# Patient Record
Sex: FEMALE | Race: WHITE | Employment: FULL TIME | URBAN - METROPOLITAN AREA
[De-identification: names, ages, dates, MRNs, and addresses within clinical notes are randomized per-mention and may not be internally consistent; named-entity substitution may affect disease eponyms.]

---

## 2017-01-09 ENCOUNTER — OFFICE VISIT (OUTPATIENT)
Dept: ORTHOPEDIC SURGERY | Age: 42
End: 2017-01-09

## 2017-01-09 VITALS
WEIGHT: 169.97 LBS | DIASTOLIC BLOOD PRESSURE: 65 MMHG | BODY MASS INDEX: 26.68 KG/M2 | HEIGHT: 67 IN | SYSTOLIC BLOOD PRESSURE: 117 MMHG | HEART RATE: 89 BPM

## 2017-01-09 DIAGNOSIS — M75.41 SHOULDER IMPINGEMENT, RIGHT: Primary | ICD-10-CM

## 2017-01-09 PROCEDURE — 99024 POSTOP FOLLOW-UP VISIT: CPT | Performed by: ORTHOPAEDIC SURGERY

## 2017-01-12 ENCOUNTER — TELEPHONE (OUTPATIENT)
Dept: ORTHOPEDIC SURGERY | Age: 42
End: 2017-01-12

## 2017-02-13 ENCOUNTER — OFFICE VISIT (OUTPATIENT)
Dept: ORTHOPEDIC SURGERY | Age: 42
End: 2017-02-13

## 2017-02-13 VITALS
HEART RATE: 92 BPM | SYSTOLIC BLOOD PRESSURE: 108 MMHG | WEIGHT: 169.97 LBS | HEIGHT: 67 IN | BODY MASS INDEX: 26.68 KG/M2 | DIASTOLIC BLOOD PRESSURE: 64 MMHG

## 2017-02-13 DIAGNOSIS — M75.41 SHOULDER IMPINGEMENT, RIGHT: Primary | ICD-10-CM

## 2017-02-13 PROCEDURE — 99024 POSTOP FOLLOW-UP VISIT: CPT | Performed by: ORTHOPAEDIC SURGERY

## 2017-05-08 ENCOUNTER — TELEPHONE (OUTPATIENT)
Dept: ORTHOPEDIC SURGERY | Age: 42
End: 2017-05-08

## 2021-11-15 ENCOUNTER — OFFICE VISIT (OUTPATIENT)
Dept: PULMONOLOGY | Age: 46
End: 2021-11-15
Payer: COMMERCIAL

## 2021-11-15 VITALS
WEIGHT: 212.5 LBS | HEIGHT: 66 IN | SYSTOLIC BLOOD PRESSURE: 122 MMHG | HEART RATE: 71 BPM | RESPIRATION RATE: 16 BRPM | OXYGEN SATURATION: 98 % | BODY MASS INDEX: 34.15 KG/M2 | DIASTOLIC BLOOD PRESSURE: 72 MMHG

## 2021-11-15 DIAGNOSIS — G25.81 RLS (RESTLESS LEGS SYNDROME): ICD-10-CM

## 2021-11-15 DIAGNOSIS — R53.83 OTHER FATIGUE: ICD-10-CM

## 2021-11-15 DIAGNOSIS — E66.9 OBESITY (BMI 30-39.9): ICD-10-CM

## 2021-11-15 DIAGNOSIS — F51.04 PSYCHOPHYSIOLOGICAL INSOMNIA: ICD-10-CM

## 2021-11-15 DIAGNOSIS — R06.83 SNORING: Primary | ICD-10-CM

## 2021-11-15 DIAGNOSIS — G47.10 HYPERSOMNIA: ICD-10-CM

## 2021-11-15 PROCEDURE — 99204 OFFICE O/P NEW MOD 45 MIN: CPT | Performed by: NURSE PRACTITIONER

## 2021-11-15 RX ORDER — OXYBUTYNIN CHLORIDE 10 MG/1
TABLET, EXTENDED RELEASE ORAL
COMMUNITY
Start: 2021-11-11

## 2021-11-15 ASSESSMENT — SLEEP AND FATIGUE QUESTIONNAIRES
HOW LIKELY ARE YOU TO NOD OFF OR FALL ASLEEP WHILE SITTING AND READING: 2
HOW LIKELY ARE YOU TO NOD OFF OR FALL ASLEEP IN A CAR, WHILE STOPPED FOR A FEW MINUTES IN TRAFFIC: 1
NECK CIRCUMFERENCE (INCHES): 13.25
HOW LIKELY ARE YOU TO NOD OFF OR FALL ASLEEP WHILE WATCHING TV: 2
HOW LIKELY ARE YOU TO NOD OFF OR FALL ASLEEP WHILE SITTING INACTIVE IN A PUBLIC PLACE: 1
HOW LIKELY ARE YOU TO NOD OFF OR FALL ASLEEP WHILE LYING DOWN TO REST IN THE AFTERNOON WHEN CIRCUMSTANCES PERMIT: 3
ESS TOTAL SCORE: 14
HOW LIKELY ARE YOU TO NOD OFF OR FALL ASLEEP WHEN YOU ARE A PASSENGER IN A CAR FOR AN HOUR WITHOUT A BREAK: 3
HOW LIKELY ARE YOU TO NOD OFF OR FALL ASLEEP WHILE SITTING AND TALKING TO SOMEONE: 1
HOW LIKELY ARE YOU TO NOD OFF OR FALL ASLEEP WHILE SITTING QUIETLY AFTER LUNCH WITHOUT ALCOHOL: 1

## 2021-11-15 NOTE — PROGRESS NOTES
Patient ID: Kenia Espinoza is a 40 y.o. female who is being seen today for   Chief Complaint   Patient presents with   Javierbon Otero Patient     Dr. Jose Antonio Reeves, snoring      Referring: Dr. Hina Loera    HPI:   Kenia Espinoza is a 40 y.o. female in office for snoring evaluation. States she is tired all of the time. Patient reports snoring at night for the past 10+ years, worse recently. Unsure if worse in supine position. Wakes self snoring. No known witnessed apnea. No restorative sleep. +dry mouth upon awakening. Fatigue and tiredness during the day. No history of sleep apnea-states has had a sleep study previously- mild maybe? States no treatment recommended at that time. Bedtime  pm and rise time is 530-7 am. It takes usually few minutes to fall asleep. 0-1 nocturia. Wakes up 6+ times at night. It takes few- unknown minutes to fall back a sleep. Takes occasional nap during the day. No headache in am. No car wrecks or near wrecks because of the sleepiness. No nodding off while driving but can get sleepy at times. Gained 60 pounds in the past 2-3 years. +forgetfulness and decreased concentration. No nasal congestion at night. Drinks 1-2 caffinated beverages per day. Occasional alcohol. +restless feelings in legs at night. No loss of muscle strength when angry or laugh. No hallucination when dozing off or waking up from sleep. No paralysis upon awakening from sleep or going to sleep.  +teeth grinding. No nightmares. No sleep walking. No night time panic attacks. No narcotics. No drug abuse. +history of depression, +history of anxiety states feels pretty well controlled. No history of atrial fibrillation. No history of DM. No history of HTN. No history of ischemic heart disease. No history of stroke. ESS is 14. No smoking. No known FH for RLS or narcolepsy.  +FH for RY- [facility]     Sleep Medicine 11/15/2021   Sitting and reading 2   Watching TV 2   Sitting, inactive in a public place (e.g. a theatre or a meeting) 1   As a passenger in a car for an hour without a break 3   Lying down to rest in the afternoon when circumstances permit 3   Sitting and talking to someone 1   Sitting quietly after a lunch without alcohol 1   In a car, while stopped for a few minutes in traffic 1   Total score 14   Neck circumference 13.25       Past Medical History:  Past Medical History:   Diagnosis Date    Insomnia        Past Surgical History:        Procedure Laterality Date    ENDOMETRIAL BIOPSY      SHOULDER SURGERY Right 11/29/2016       Allergies:  is allergic to pcn [penicillins]. Social History:    TOBACCO:   reports that she has never smoked. She has never used smokeless tobacco.  ETOH:   reports no history of alcohol use. Family History:   History reviewed. No pertinent family history.     Current Medications:    Current Outpatient Medications:     oxybutynin (DITROPAN-XL) 10 MG extended release tablet, , Disp: , Rfl:     cetirizine (ZYRTEC) 10 MG tablet, Take 10 mg by mouth daily, Disp: , Rfl:     FLUoxetine (PROZAC) 20 MG capsule, , Disp: , Rfl: 0    promethazine (PHENERGAN) 25 MG tablet, TAKE 1 TABELT BY MOUTH EVERY 6 HOURS AS NEEDED FOR NAUSEA (Patient not taking: Reported on 11/15/2021), Disp: , Rfl: 0    azithromycin (ZITHROMAX) 250 MG tablet, , Disp: , Rfl:     Review of Systems  Constitutional: Negative for fever  HENT: Negative for sore throat  Eyes: Negative for redness   Respiratory: Negative for dyspnea, cough  Cardiovascular: Negative for chest pain  Gastrointestinal: Negative for vomiting, diarrhea   Genitourinary: Negative for hematuria   Musculoskeletal: Negative forarthralgias   Skin: Negative for rash  Neurological: Negative for syncope  Hematological: Negative for adenopathy  Psychiatric/Behavorial: Negative for anxiety      Objective:   PHYSICAL EXAM:  /72   Pulse 71   Resp 16   Ht 5' 6\" (1.676 m)   Wt 212 lb 8 oz (96.4 kg)   SpO2 98% Comment: with RA  BMI 34.30 kg/m²     Physical Exam  Gen: No acute distress. Obese. BMI of 34.30  Eyes: PERRL. No sclera icterus. No conjunctival injection. ENT: No discharge. Pharynx clear. Mallampati class IV. Neck: Trachea midline. No obvious mass. Neck circumference 13.25\"  Resp: No accessory muscle use. Nocrackles. No wheezes. No rhonchi. CV: Regular rate. Regular rhythm. No murmur or rub. Skin: Warm and dry. M/S: No cyanosis. No obvious joint deformity. Neuro: Awake. Alert. Moves all four extremities. Psych: Oriented x 3. No anxiety. DATA:       Assessment:       · Snoring  · Hypersomnia   · Sleep maintenance insomnia  · RLS  · Obesity        Plan:      · PSG (pt preference vs HST) to evaluate forsleep related breathing disorder. · Treatment options were discussed with patient if PSG reveals RY, including CPAP therapy, oral appliances and upper airway surgery. · Sleep hygiene  · D/W patient non pharmacological therapy for RLS including avoiding aggravating factors (sleep deprivation, mental alerting activities at time of rest, antihistamines), moderate regular exercise, leg message and heating pads/hot shower. · Cognitive behavioral therapy was discussed with patient including stimulus control and sleep restriction   · Avoid sedatives, alcohol and caffeinated drinks at bed time. · No driving motorized vehicles or operating heavy machinery while fatigue, drowsy or sleepy-patient verbalized understanding and agrees. · Weight loss is also recommended as a long-term intervention. · Complications of RY if not treated were discussed with patient patient to include systemic hypertension, pulmonary hypertension, cardiovascular morbidities, car accidents and all cause mortality.   Discussed pathophysiology of RY with patient today  Patient education handout provided regarding sleep tips

## 2021-11-15 NOTE — PATIENT INSTRUCTIONS
Absolutely no driving if sleepy. It is your responsibility not to drive if fatigued, tired, or sleepy   Sleep Hygiene. .. Tips for better sleep. .. Avoid naps. This will ensure you are sleepy at bedtime. If you have to take a nap, sleep less than 1 hour, before 3 pm.  Sleep only when sleepy; this reduces the time you are awake in bed. Regular exercise is recommended to help you deepen your sleep, but not within 4-6 hours of your bedtime. Timing of exercise is important, aim to exercise early in the morning or early afternoon. A light snack may help you fall asleep. Warm milk and foods high in the amino acid tryptophan, such as bananas, may help you to sleep  Be sure to avoid heavy, spicy or sugary foods 4-6 hours before bedtime and avoid at snack time. Stay away from stimulants such as caffeine and nicotine for at least 4-6 hours before bed. Stimulants can interfere with your ability to fall asleep. Caffeine is found in tea, cola, coffee, cocoa and chocolate and is best avoided at bedtime. Nicotine is found in tobacco products. Avoid alcohol 4-6 hours before bedtime. Alcohol has an immediate sleep-inducing effect, after a few hours when alcohol levels fall there is a stimulant or wake-up effect and will cause fragmented sleep. Sleep rituals are important. Give your body clues it is time to slow down and sleep. Examples include; yoga, deep breathing, listen to relaxing music, a hot bath or a few minutes of reading. Have a fixed bedtime and awakening time, Even on weekends! You will feel better keeping a regular sleep cycle, even if you are retired or not working. Get into your favorite sleep position. If not asleep in 30 minutes, get up and do something boring until you feel sleepy. Remember not to expose yourself to bright lights such as TV, phone or tablet screens. Only use your bed for sleeping. Do not use your bed as an office, workroom or recreation room. Use comfortable bedding.  Uncomfortable bedding can prevent good sleep. Ensure your bedroom is quiet and comfortable. A cooler room along with enough blankets to stay warm is recommended. If your room is too noisy, try a white noise machine. If too bright, try black out shades or an eye mask. Dont take worries to bed. Leave worries about work, school etc. behind you when you go to bed. Some people find it helpful to assign a worry period in the evening or late afternoon to write down your worries and get them out of your system. [facility] at [address] [place] [address], [address] [place] [address]                      Phone: [phone] Fax: [phone]      Please arrive at the [facility] at the time indicated. On Saturday and Sunday night a sleep tech will come down to let you in the building and escort you upstairs to the sleep center; please call from the parking lot if no one is at the door when you arrive. PLEASE DO NOT ARRIVE TO THE SLEEP CENTER ANY EARLIER THAN 8:30PM AS THERE IS NO STAFF ON DUTY AND THE DOORS WILL BE LOCKED    IMPORTANT: We ask that you please phone the [address] [place] [address] Patient Pre-Services ([phone]) at least 3-5 days prior to your sleep study to pre-register. Failing to pre-register may ultimately cause your insurance to not pay for this procedure. Please be aware that [address] [place] [address] is a non-smoking facility. There is no smoking allowed anywhere on [facility] at any time. Each patient room is a private room with cable television, WiFi and a private bathroom with shower facilities. The test itself consists of electrodes and sensors attached to specific areas of your scalp, face, chest and legs. We will also monitor respiratory effort, nasal and oral airflow and oxygen levels. The test will not hurt; it is completely painless and not invasive in any way. Please bring with you:  ? Appropriate nightclothes (pajamas, sweats, etc.), slippers and robe  ?  All

## 2021-12-06 ENCOUNTER — HOSPITAL ENCOUNTER (OUTPATIENT)
Dept: SLEEP CENTER | Age: 46
Discharge: HOME | End: 2021-12-08
Payer: COMMERCIAL

## 2021-12-06 DIAGNOSIS — R53.83 OTHER FATIGUE: ICD-10-CM

## 2021-12-06 DIAGNOSIS — G25.81 RLS (RESTLESS LEGS SYNDROME): ICD-10-CM

## 2021-12-06 DIAGNOSIS — E66.9 OBESITY (BMI 30-39.9): ICD-10-CM

## 2021-12-06 DIAGNOSIS — F51.04 PSYCHOPHYSIOLOGICAL INSOMNIA: ICD-10-CM

## 2021-12-06 DIAGNOSIS — R06.83 SNORING: ICD-10-CM

## 2021-12-06 DIAGNOSIS — G47.10 HYPERSOMNIA: ICD-10-CM

## 2021-12-06 PROCEDURE — 95810 POLYSOM 6/> YRS 4/> PARAM: CPT

## 2021-12-07 PROCEDURE — 95810 POLYSOM 6/> YRS 4/> PARAM: CPT | Performed by: INTERNAL MEDICINE

## 2021-12-10 ENCOUNTER — TELEPHONE (OUTPATIENT)
Dept: PULMONOLOGY | Age: 46
End: 2021-12-10

## 2021-12-10 DIAGNOSIS — G47.33 OSA (OBSTRUCTIVE SLEEP APNEA): Primary | ICD-10-CM

## 2021-12-27 ENCOUNTER — HOSPITAL ENCOUNTER (OUTPATIENT)
Dept: SLEEP CENTER | Age: 46
Discharge: HOME | End: 2021-12-29
Payer: COMMERCIAL

## 2021-12-27 DIAGNOSIS — G47.33 OSA (OBSTRUCTIVE SLEEP APNEA): ICD-10-CM

## 2021-12-27 PROCEDURE — 95811 POLYSOM 6/>YRS CPAP 4/> PARM: CPT

## 2022-01-04 ENCOUNTER — TELEPHONE (OUTPATIENT)
Dept: PULMONOLOGY | Age: 47
End: 2022-01-04

## 2022-01-04 DIAGNOSIS — G47.33 OSA (OBSTRUCTIVE SLEEP APNEA): Primary | ICD-10-CM

## 2022-03-14 ENCOUNTER — OFFICE VISIT (OUTPATIENT)
Dept: PULMONOLOGY | Age: 47
End: 2022-03-14
Payer: COMMERCIAL

## 2022-03-14 VITALS
HEIGHT: 67 IN | BODY MASS INDEX: 29.26 KG/M2 | HEART RATE: 86 BPM | SYSTOLIC BLOOD PRESSURE: 118 MMHG | TEMPERATURE: 97.1 F | OXYGEN SATURATION: 96 % | WEIGHT: 186.4 LBS | DIASTOLIC BLOOD PRESSURE: 70 MMHG | RESPIRATION RATE: 20 BRPM

## 2022-03-14 DIAGNOSIS — G25.81 RLS (RESTLESS LEGS SYNDROME): ICD-10-CM

## 2022-03-14 DIAGNOSIS — G47.10 HYPERSOMNIA: ICD-10-CM

## 2022-03-14 DIAGNOSIS — G47.33 MILD OBSTRUCTIVE SLEEP APNEA: Primary | ICD-10-CM

## 2022-03-14 DIAGNOSIS — Z71.89 CPAP USE COUNSELING: ICD-10-CM

## 2022-03-14 DIAGNOSIS — R53.83 OTHER FATIGUE: ICD-10-CM

## 2022-03-14 PROCEDURE — 99213 OFFICE O/P EST LOW 20 MIN: CPT | Performed by: NURSE PRACTITIONER

## 2022-03-14 ASSESSMENT — SLEEP AND FATIGUE QUESTIONNAIRES
HOW LIKELY ARE YOU TO NOD OFF OR FALL ASLEEP IN A CAR, WHILE STOPPED FOR A FEW MINUTES IN TRAFFIC: 2
HOW LIKELY ARE YOU TO NOD OFF OR FALL ASLEEP WHILE SITTING QUIETLY AFTER LUNCH WITHOUT ALCOHOL: 1
HOW LIKELY ARE YOU TO NOD OFF OR FALL ASLEEP WHILE WATCHING TV: 1
HOW LIKELY ARE YOU TO NOD OFF OR FALL ASLEEP WHILE SITTING INACTIVE IN A PUBLIC PLACE: 0
HOW LIKELY ARE YOU TO NOD OFF OR FALL ASLEEP WHILE SITTING AND READING: 1
NECK CIRCUMFERENCE (INCHES): 13
HOW LIKELY ARE YOU TO NOD OFF OR FALL ASLEEP WHILE LYING DOWN TO REST IN THE AFTERNOON WHEN CIRCUMSTANCES PERMIT: 3
HOW LIKELY ARE YOU TO NOD OFF OR FALL ASLEEP WHILE SITTING AND TALKING TO SOMEONE: 1
ESS TOTAL SCORE: 12
HOW LIKELY ARE YOU TO NOD OFF OR FALL ASLEEP WHEN YOU ARE A PASSENGER IN A CAR FOR AN HOUR WITHOUT A BREAK: 3

## 2022-03-14 NOTE — PROGRESS NOTES
Patient ID: Eva Milner is a 40 y.o. female who is being seen today for   Chief Complaint   Patient presents with    Follow-up     31-90     Referring: Dr. Kenyatta Kaiser    HPI:     Eva Milner is a 40 y.o. female in office for RY follow up. PSG and CPAP titration were reviewed by me and noted below. Results were dicussed with patient and multiple good questions were answered. PSG showed mild RY and patient started CPAP after testing. States she is doing okay with CPAP. States does feel a little better when waking, but is still fatigued. Patient is using CPAP  8-9 hrs/night. Using humidifier. No snoring on CPAP. The pressure is well tolerated. The mask is comfortable- nasal mask. No mask leak. +daytime sleepiness. Denies nodding off when driving. No dry nose or throat. +fatigue. Bedtime is  pm and rise time is 530-6 am. Sleep onset is few minutes. Wakes up 2-3 times at night total. No nocturia. It takes usually few minutes to fall back a sleep. Rare naps during the day. No headache in am. No weight gain. 1-2 caffienated beverages during the day. Occasional alcohol. ESS is 12      No hallucinations when falling or waking from sleep  No cataplexy  No sleep paralysis  Some RLS symptoms but better since starting CPAP. Initial HPI 11/15/21  Eva Milner is a 40 y.o. female in office for snoring evaluation. States she is tired all of the time. Patient reports snoring at night for the past 10+ years, worse recently. Unsure if worse in supine position. Wakes self snoring. No known witnessed apnea. No restorative sleep. +dry mouth upon awakening. Fatigue and tiredness during the day. No history of sleep apnea-states has had a sleep study previously- mild maybe? States no treatment recommended at that time. Bedtime  pm and rise time is 530-7 am. It takes usually few minutes to fall asleep. 0-1 nocturia. Wakes up 6+ times at night.  It takes few- unknown minutes to fall back a sleep. Takes occasional nap during the day. No headache in am. No car wrecks or near wrecks because of the sleepiness. No nodding off while driving but can get sleepy at times. Gained 60 pounds in the past 2-3 years. +forgetfulness and decreased concentration. No nasal congestion at night. Drinks 1-2 caffinated beverages per day. Occasional alcohol. +restless feelings in legs at night. No loss of muscle strength when angry or laugh. No hallucination when dozing off or waking up from sleep. No paralysis upon awakening from sleep or going to sleep.  +teeth grinding. No nightmares. No sleep walking. No night time panic attacks. No narcotics. No drug abuse. +history of depression, +history of anxiety states feels pretty well controlled. No history of atrial fibrillation. No history of DM. No history of HTN. No history of ischemic heart disease. No history of stroke. ESS is 14. No smoking. No known FH for RLS or narcolepsy. +FH for RY- [facility]     Sleep Medicine 3/14/2022 11/15/2021   Sitting and reading 1 2   Watching TV 1 2   Sitting, inactive in a public place (e.g. a theatre or a meeting) 0 1   As a passenger in a car for an hour without a break 3 3   Lying down to rest in the afternoon when circumstances permit 3 3   Sitting and talking to someone 1 1   Sitting quietly after a lunch without alcohol 1 1   In a car, while stopped for a few minutes in traffic 2 1   Total score 12 14   Neck circumference (Inches) 13 13.25       Past Medical History:  Past Medical History:   Diagnosis Date    Insomnia        Past Surgical History:        Procedure Laterality Date    ENDOMETRIAL BIOPSY      SHOULDER SURGERY Right 11/29/2016       Allergies:  is allergic to pcn [penicillins]. Social History:    TOBACCO:   reports that she has never smoked. She has never used smokeless tobacco.  ETOH:   reports no history of alcohol use. Family History:   History reviewed. No pertinent family history.     Current Medications:    Current Outpatient Medications:     levonorgestrel (MIRENA, 52 MG,) IUD 52 mg, 1 Units by IntraUTERine route, Disp: , Rfl:     oxybutynin (DITROPAN-XL) 10 MG extended release tablet, , Disp: , Rfl:     cetirizine (ZYRTEC) 10 MG tablet, Take 10 mg by mouth daily, Disp: , Rfl:     FLUoxetine (PROZAC) 20 MG capsule, , Disp: , Rfl: 0    promethazine (PHENERGAN) 25 MG tablet, TAKE 1 TABELT BY MOUTH EVERY 6 HOURS AS NEEDED FOR NAUSEA (Patient not taking: Reported on 11/15/2021), Disp: , Rfl: 0    azithromycin (ZITHROMAX) 250 MG tablet, , Disp: , Rfl:     Review of Systems        Objective:   PHYSICAL EXAM:  /70   Pulse 86   Temp 97.1 °F (36.2 °C)   Resp 20   Ht 5' 7\" (1.702 m)   Wt 186 lb 6.4 oz (84.6 kg)   SpO2 96% Comment: RA  BMI 29.19 kg/m²     Physical Exam  Gen: No acute distress. Obese. BMI of 29.19  Eyes: PERRL. No sclera icterus. No conjunctival injection. ENT: No discharge. Pharynx clear. Mallampati class IV. Neck: Trachea midline. No obvious mass. Neck circumference 13\"  Resp: No accessory muscle use. Nocrackles. No wheezes. No rhonchi. CV: Regular rate. Regular rhythm. No murmur or rub. Skin: Warm and dry. M/S: No cyanosis. No obvious joint deformity. Neuro: Awake. Alert. Moves all four extremities. Psych: Oriented x 3. No anxiety. DATA:   12/6/2021 PSG AHI 9/REM AHI 38.3, low SPO2 88%  12/27/2021 titration improved RY with CPAP, treatment emergent CSA. Recommendations trial of auto CPAP 6-12 cm H2O    CPAP download data:  Compliance download report from 2/9/22 to 3/10/22 reviewed today by me and showed patient is using machine 8:37 hrs/night with 97% compliance and AHI 1.6 within this time frame. 29/30days with greater than 4 hours of machine use. 90% pressure 9.2 cm H20 on auto CPAP 6-12 cm H2O       Assessment:       · Mild RY.   Optimal compliance on review today  · Snoring-resolved on CPAP  · Hypersomnia   · Sleep maintenance insomnia-improved with CPAP use  · RLS-improving  · Obesity        Plan:      · Changed in office to auto CPAP 9-13 cm H2O   · Discussed multiple causes of fatigue and tiredness. Patient will follow up with PCP for other causes and will follow up here in 6 weeks after pressure change. Could consider MSLT if no improvement  Advised to use CPAP 6-8 hrs at night and during naps. Replacement of mask, tubing, head straps every 3-6 months or sooner if damaged. Patient instructed to contact [employer] company for any mask, tubing or machine trouble shooting if problems arise. · Sleep hygiene  · D/W patient non pharmacological therapy for RLS including avoiding aggravating factors (sleep deprivation, mental alerting activities at time of rest, antihistamines), moderate regular exercise, leg message and heating pads/hot shower. · Cognitive behavioral therapy was discussed with patient including stimulus control and sleep restriction   · Avoid sedatives, alcohol and caffeinated drinks at bed time. · No driving motorized vehicles or operating heavy machinery while fatigue, drowsy or sleepy-patient verbalized understanding and agrees. · Weight loss is also recommended as a long-term intervention. · Complications of RY if not treated were discussed with patient patient to include systemic hypertension, pulmonary hypertension, cardiovascular morbidities, car accidents and all cause mortality.   Discussed pathophysiology of RY with patient today  Patient education handout provided regarding sleep tips     Follow-up: 6 weeks, sooner if needed

## 2022-03-14 NOTE — PATIENT INSTRUCTIONS
Absolutely no driving if sleepy. It is your responsibility not to drive if fatigued, tired, or sleepy     Sleep Hygiene. .. Tips for better sleep. .. Avoid naps. This will ensure you are sleepy at bedtime. If you have to take a nap, sleep less than 1 hour, before 3 pm.  Sleep only when sleepy; this reduces the time you are awake in bed. Regular exercise is recommended to help you deepen your sleep, but not within 4-6 hours of your bedtime. Timing of exercise is important, aim to exercise early in the morning or early afternoon. A light snack may help you fall asleep. Warm milk and foods high in the amino acid tryptophan, such as bananas, may help you to sleep  Be sure to avoid heavy, spicy or sugary foods 4-6 hours before bedtime and avoid at snack time. Stay away from stimulants such as caffeine and nicotine for at least 4-6 hours before bed. Stimulants can interfere with your ability to fall asleep. Caffeine is found in tea, cola, coffee, cocoa and chocolate and is best avoided at bedtime. Nicotine is found in tobacco products. Avoid alcohol 4-6 hours before bedtime. Alcohol has an immediate sleep-inducing effect, after a few hours when alcohol levels fall there is a stimulant or wake-up effect and will cause fragmented sleep. Sleep rituals are important. Give your body clues it is time to slow down and sleep. Examples include; yoga, deep breathing, listen to relaxing music, a hot bath or a few minutes of reading. Have a fixed bedtime and awakening time, Even on weekends! You will feel better keeping a regular sleep cycle, even if you are retired or not working. Get into your favorite sleep position. If not asleep in 30 minutes, get up and do something boring until you feel sleepy. Remember not to expose yourself to bright lights such as TV, phone or tablet screens. Only use your bed for sleeping. Do not use your bed as an office, workroom or recreation room. Use comfortable bedding.  Uncomfortable bedding can prevent good sleep. Ensure your bedroom is quiet and comfortable. A cooler room along with enough blankets to stay warm is recommended. If your room is too noisy, try a white noise machine. If too bright, try black out shades or an eye mask. Dont take worries to bed. Leave worries about work, school etc. behind you when you go to bed. Some people find it helpful to assign a worry period in the evening or late afternoon to write down your worries and get them out of your system. CPAP Equipment Cleaning and Disinfecting Schedule  Equipment Cleaning Frequency Instructions  Disinfecting Frequency   Non-Disposable Filters  Weekly Mild soapy water, Rinse, Air Dry Not Required   Disposable Filters Change as needed  2-4 weeks Do Not Wash Not Required   Hose/tubing Daily Mild soapy water, Rinse, Air Dry Once a week   Mask / Nasal Pillows Daily Mild soapy water, Rinse, Air Dry Once a week   Headgear Weekly Hand wash, Mild soapy water, Rinse, Dry  Not Required   Humidifier Daily Empty water daily  Mild soapy water, Rinse well, Air Dry  Once a week   CPAP Unit As Needed Dust with damp cloth,  No detergents or sprays Not Required         Disinfect (per schedule) with 1 part white vinegar and 3 parts water- soak mask and water chamber for 30 minutes every 1-2 weeks, more often if sick. Allow water/vinegar mixture to run through tubing. Allow all equipment to air dry. Drying Hints:   Always hang tubing away from direct sunlight, as this will cause the tubing to become yellow, brittle and crack over a period of time. DO NOT attach the wet tubing to your CPAP unit to blow-dry it. The moisture from the tubing can drain back into your machine. Moisture in your unit can cause sudden pressure increases or short circuits  DO's and DON'Ts:  - Don't use alcohol-based products to clean your mask, because it can cause the materials to become hard and brittle.    - Don't put headgear in the washer or dryer  - Don't use any caustic or household cleaning solutions such as bleach on your CPAP   equipment.  - Do follow the recommended cleaning schedule. - Do change your disposable filter frequently. Adapted From: [email or web address]/cleaning. shtm.   These are general suggestions for all models please follow specific s recommendations and specific instructions

## 2022-04-25 ENCOUNTER — HOSPITAL ENCOUNTER (OUTPATIENT)
Dept: MAMMOGRAPHY | Age: 47
Discharge: HOME | End: 2022-04-25
Payer: COMMERCIAL

## 2022-04-25 DIAGNOSIS — Z12.39 SCREENING BREAST EXAMINATION: ICD-10-CM

## 2022-04-25 PROCEDURE — 77063 BREAST TOMOSYNTHESIS BI: CPT

## 2022-04-26 ENCOUNTER — TELEPHONE (OUTPATIENT)
Dept: PULMONOLOGY | Age: 47
End: 2022-04-26

## 2022-04-26 ENCOUNTER — TELEMEDICINE (OUTPATIENT)
Dept: SLEEP MEDICINE | Age: 47
End: 2022-04-26
Payer: COMMERCIAL

## 2022-04-26 DIAGNOSIS — R53.83 OTHER FATIGUE: ICD-10-CM

## 2022-04-26 DIAGNOSIS — G47.33 MILD OBSTRUCTIVE SLEEP APNEA: Primary | ICD-10-CM

## 2022-04-26 DIAGNOSIS — G47.10 HYPERSOMNIA: ICD-10-CM

## 2022-04-26 DIAGNOSIS — G25.81 RLS (RESTLESS LEGS SYNDROME): ICD-10-CM

## 2022-04-26 PROCEDURE — 99214 OFFICE O/P EST MOD 30 MIN: CPT | Performed by: NURSE PRACTITIONER

## 2022-04-26 RX ORDER — ROPINIROLE 0.5 MG/1
0.5 TABLET, FILM COATED ORAL NIGHTLY
Qty: 30 TABLET | Refills: 1 | Status: SHIPPED | OUTPATIENT
Start: 2022-04-26 | End: 2022-05-26 | Stop reason: SDUPTHER

## 2022-04-26 ASSESSMENT — SLEEP AND FATIGUE QUESTIONNAIRES
ESS TOTAL SCORE: 10
HOW LIKELY ARE YOU TO NOD OFF OR FALL ASLEEP WHEN YOU ARE A PASSENGER IN A CAR FOR AN HOUR WITHOUT A BREAK: 3
HOW LIKELY ARE YOU TO NOD OFF OR FALL ASLEEP WHILE SITTING QUIETLY AFTER LUNCH WITHOUT ALCOHOL: 1
HOW LIKELY ARE YOU TO NOD OFF OR FALL ASLEEP WHILE SITTING AND TALKING TO SOMEONE: 0
HOW LIKELY ARE YOU TO NOD OFF OR FALL ASLEEP WHILE WATCHING TV: 1
HOW LIKELY ARE YOU TO NOD OFF OR FALL ASLEEP IN A CAR, WHILE STOPPED FOR A FEW MINUTES IN TRAFFIC: 1
HOW LIKELY ARE YOU TO NOD OFF OR FALL ASLEEP WHILE LYING DOWN TO REST IN THE AFTERNOON WHEN CIRCUMSTANCES PERMIT: 3
HOW LIKELY ARE YOU TO NOD OFF OR FALL ASLEEP WHILE SITTING INACTIVE IN A PUBLIC PLACE: 0
HOW LIKELY ARE YOU TO NOD OFF OR FALL ASLEEP WHILE SITTING AND READING: 1

## 2022-04-26 NOTE — TELEPHONE ENCOUNTER
Patient needs a 4 week follow up - needs to be in office due to medication being prescribed. I scheduled patient for 5/18/22 in office at [facility] [place] at 10:40am.    Patient called with message left for patient to call back to office.

## 2022-04-26 NOTE — PATIENT INSTRUCTIONS
awakening time, Even on weekends! You will feel better keeping a regular sleep cycle, even if you are retired or not working. Get into your favorite sleep position. If not asleep in 30 minutes, get up and do something boring until you feel sleepy. Remember not to expose yourself to bright lights such as TV, phone or tablet screens. Only use your bed for sleeping. Do not use your bed as an office, workroom or recreation room. Use comfortable bedding. Uncomfortable bedding can prevent good sleep. Ensure your bedroom is quiet and comfortable. A cooler room along with enough blankets to stay warm is recommended. If your room is too noisy, try a white noise machine. If too bright, try black out shades or an eye mask. Dont take worries to bed. Leave worries about work, school etc. behind you when you go to bed. Some people find it helpful to assign a worry period in the evening or late afternoon to write down your worries and get them out of your system. CPAP Equipment Cleaning and Disinfecting Schedule  Equipment Cleaning Frequency Instructions  Disinfecting Frequency   Non-Disposable Filters  Weekly Mild soapy water, Rinse, Air Dry Not Required   Disposable Filters Change as needed  2-4 weeks Do Not Wash Not Required   Hose/tubing Daily Mild soapy water, Rinse, Air Dry Once a week   Mask / Nasal Pillows Daily Mild soapy water, Rinse, Air Dry Once a week   Headgear Weekly Hand wash, Mild soapy water, Rinse, Dry  Not Required   Humidifier Daily Empty water daily  Mild soapy water, Rinse well, Air Dry  Once a week   CPAP Unit As Needed Dust with damp cloth,  No detergents or sprays Not Required         Disinfect (per schedule) with 1 part white vinegar and 3 parts water- soak mask and water chamber for 30 minutes every 1-2 weeks, more often if sick. Allow water/vinegar mixture to run through tubing. Allow all equipment to air dry.    Drying Hints:   Always hang tubing away from direct sunlight, as this will cause the tubing to become yellow, brittle and crack over a period of time. DO NOT attach the wet tubing to your CPAP unit to blow-dry it. The moisture from the tubing can drain back into your machine. Moisture in your unit can cause sudden pressure increases or short circuits  DO's and DON'Ts:  - Don't use alcohol-based products to clean your mask, because it can cause the materials to become hard and brittle. - Don't put headgear in the washer or dryer  - Don't use any caustic or household cleaning solutions such as bleach on your CPAP   equipment.  - Do follow the recommended cleaning schedule. - Do change your disposable filter frequently. Adapted From: [email or web address]/cleaning. shtm.   These are general suggestions for all models please follow specific s recommendations and specific instructions

## 2022-04-26 NOTE — PROGRESS NOTES
Patient ID: Aung So is a 40 y.o. female who is being seen today for   Chief Complaint   Patient presents with    Follow-up     6 week      Referring: Dr. Zulma Oakes    HPI:     Aung So is a 40 y.o. female for televideo appointment via video and audio doxy. me virtual visit for RY follow up. States pressure change  Patient is using CPAP 8-9 hrs/night. Using humidifier. No snoring on CPAP. The pressure is well tolerated. The mask is comfortable-nasal. No mask leak. +daytime sleepiness. States went to PCP and states did not really find other causes of fatigue. Denies nodding off when driving. No dry nose or throat. No fatigue. Bedtime is  pm and rise time is 530-6 am. Sleep onset is usually few minutes. Wakes up 4-5 times at night total, feels restless. 0-1 nocturia. It takes usually few minutes to fall back a sleep. No naps during the day. No headache in am. No weight gain. 2 caffienated beverages during the day. Occassioanl alcohol. ESS is 10. Patient still having daytime sleepiness despite adequate treatment of sleep apnea. She does state has some restlessness at night and aching in legs. She denies cataplexy. She denies sleep paralysis. She denies hallucinations when falling or waking from sleep. States she has also discussed other causes of fatigue with PCP. Previous HPI 3/14/22  Aung So is a 40 y.o. female in office for RY follow up. PSG and CPAP titration were reviewed by me and noted below. Results were dicussed with patient and multiple good questions were answered. PSG showed mild RY and patient started CPAP after testing. States she is doing okay with CPAP. States does feel a little better when waking, but is still fatigued. Patient is using CPAP  8-9 hrs/night. Using humidifier. No snoring on CPAP. The pressure is well tolerated. The mask is comfortable- nasal mask. No mask leak. +daytime sleepiness. Denies nodding off when driving.  No dry nose or throat. +fatigue. Bedtime is  pm and rise time is 530-6 am. Sleep onset is few minutes. Wakes up 2-3 times at night total. No nocturia. It takes usually few minutes to fall back a sleep. Rare naps during the day. No headache in am. No weight gain. 1-2 caffienated beverages during the day. Occasional alcohol. ESS is 12      No hallucinations when falling or waking from sleep  No cataplexy  No sleep paralysis  Some RLS symptoms but better since starting CPAP. Initial HPI 11/15/21  Vj Robert is a 40 y.o. female in office for snoring evaluation. States she is tired all of the time. Patient reports snoring at night for the past 10+ years, worse recently. Unsure if worse in supine position. Wakes self snoring. No known witnessed apnea. No restorative sleep. +dry mouth upon awakening. Fatigue and tiredness during the day. No history of sleep apnea-states has had a sleep study previously- mild maybe? States no treatment recommended at that time. Bedtime  pm and rise time is 530-7 am. It takes usually few minutes to fall asleep. 0-1 nocturia. Wakes up 6+ times at night. It takes few- unknown minutes to fall back a sleep. Takes occasional nap during the day. No headache in am. No car wrecks or near wrecks because of the sleepiness. No nodding off while driving but can get sleepy at times. Gained 60 pounds in the past 2-3 years. +forgetfulness and decreased concentration. No nasal congestion at night. Drinks 1-2 caffinated beverages per day. Occasional alcohol. +restless feelings in legs at night. No loss of muscle strength when angry or laugh. No hallucination when dozing off or waking up from sleep. No paralysis upon awakening from sleep or going to sleep.  +teeth grinding. No nightmares. No sleep walking. No night time panic attacks. No narcotics. No drug abuse. +history of depression, +history of anxiety states feels pretty well controlled. No history of atrial fibrillation.  No history of DM. No history of HTN. No history of ischemic heart disease. No history of stroke. ESS is 14. No smoking. No known FH for RLS or narcolepsy. +FH for RY- mom     Sleep Medicine 4/26/2022 3/14/2022 11/15/2021   Sitting and reading 1 1 2   Watching TV 1 1 2   Sitting, inactive in a public place (e.g. a theatre or a meeting) 0 0 1   As a passenger in a car for an hour without a break 3 3 3   Lying down to rest in the afternoon when circumstances permit 3 3 3   Sitting and talking to someone 0 1 1   Sitting quietly after a lunch without alcohol 1 1 1   In a car, while stopped for a few minutes in traffic 1 2 1   Total score 10 12 14   Neck circumference (Inches) - 13 13.25       Past Medical History:  Past Medical History:   Diagnosis Date    Insomnia        Past Surgical History:        Procedure Laterality Date    ENDOMETRIAL BIOPSY      SHOULDER SURGERY Right 11/29/2016       Allergies:  is allergic to pcn [penicillins]. Social History:    TOBACCO:   reports that she has never smoked. She has never used smokeless tobacco.  ETOH:   reports no history of alcohol use. Family History:   History reviewed. No pertinent family history. Current Medications:    Current Outpatient Medications:     levonorgestrel (MIRENA, 52 MG,) IUD 52 mg, 1 Units by IntraUTERine route, Disp: , Rfl:     oxybutynin (DITROPAN-XL) 10 MG extended release tablet, , Disp: , Rfl:     cetirizine (ZYRTEC) 10 MG tablet, Take 10 mg by mouth daily, Disp: , Rfl:     FLUoxetine (PROZAC) 20 MG capsule, , Disp: , Rfl: 0    Review of Systems        Objective:   PHYSICAL EXAM:  There were no vitals taken for this visit. Physical Exam  Exam:  Gen: No acute distress, does not appear to be in pain. Appears well developed and nourished. HENT: Head is normocephalic and atraumatic. Normal appearing nose. External Ears normal.   Neck: No visualized mass. Trachea is midline   Eyes: EOM intact. No visible discharge.    Resp:No visualized signs of difficulty breathing or respiratory distress, speaking in full sentences. Respiratory effort normal.  Neuro: Awake. Alert. Able to follow commands. No facial asymmetry. Skin: No significant lesions or discoloration noted on facial skin    Musculoskeletal: Normal range of motion of the neck. Psych: Oriented x 3. No anxiety. Normal affect. DATA:   12/6/2021 PSG AHI 9/REM AHI 38.3, low SPO2 88%  12/27/2021 titration improved RY with CPAP, treatment emergent CSA. Recommendations trial of auto CPAP 6-12 cm H2O    CPAP download data:  Compliance download report from 2/9/22 to 3/10/22 reviewed today by me and showed patient is using machine 8:37 hrs/night with 97% compliance and AHI 1.6 within this time frame. 29/30days with greater than 4 hours of machine use. 90% pressure 9.2 cm H20 on auto CPAP 6-12 cm H2O     Compliance download report from 3/23/22 to 4/21/22 reviewed today by me and showed patient is using machine 8:27 hrs/night with 100% compliance and AHI 1.4 within this time frame. 30/30days with greater than 4 hours of machine use. 90% pressure 10.6 cm H20 on auto CPAP 6-12 cm H2O (report showing 6-12 cm H2O as current pressure however in [employer] [facility] pressure is showing as auto CPAP 9-13 cm H2O)    Assessment:       · Mild RY. Auto CPAP 9-13 cm H2O? Optimal compliance on review today  · Snoring-resolved on CPAP  · Hypersomnia   · Sleep maintenance insomnia  · RLS  · Obesity        Plan:      · Auto CPAP 9-13 cm H2O -message left with [employer] rep to verify correct pressure  · Trial Requip 0.5 mg. The side effects of ropinirole have been discussed with the patient and or family, including but not limited to compulsive behaviors, mental status changes, sleep attacks and cardiovascular side effects. As with all medications, patient was instructed to read package insert & to call with any concerning side effects.   · Could consider MSLT if no improvement in sleepiness  Advised to use CPAP 6-8 hrs at night and during naps. Replacement of mask, tubing, head straps every 3-6 months or sooner if damaged. Patient instructed to contact DME company for any mask, tubing or machine trouble shooting if problems arise. · Sleep hygiene  · D/W patient non pharmacological therapy for RLS including avoiding aggravating factors (sleep deprivation, mental alerting activities at time of rest, antihistamines), moderate regular exercise, leg message and heating pads/hot shower. · Cognitive behavioral therapy was discussed with patient including stimulus control and sleep restriction   · Avoid sedatives, alcohol and caffeinated drinks at bed time. · No driving motorized vehicles or operating heavy machinery while fatigue, drowsy or sleepy-patient verbalized understanding and agrees. · Weight loss is also recommended as a long-term intervention. · Complications of RY if not treated were discussed with patient patient to include systemic hypertension, pulmonary hypertension, cardiovascular morbidities, car accidents and all cause mortality. Discussed pathophysiology of RY with patient today  Patient education handout provided regarding sleep tips     Follow-up: 4-6 weeks, sooner if needed    Nicole Marcial, was evaluated through a synchronous (real-time) audio-video encounter. The patient (or guardian if applicable) is aware that this is a billable service, which includes applicable co-pays. This Virtual Visit was conducted with patient's (and/or legal guardian's) consent. The visit was conducted pursuant to the emergency declaration under the [facility], [address] [place] [address] waiver authority and the [employer] Act. Patient identification was verified, and a caregiver was present when appropriate. The patient was located at home in a state where the provider was licensed to provide care.        Total time spent for this encounter: Not billed by time    --VICTORINO Antony - CNP on 4/26/2022 at 9:47 AM    An electronic signature was used to authenticate this note.

## 2022-04-26 NOTE — TELEPHONE ENCOUNTER
Within this Telehealth Consent, the terms you and yours refer to the person using the Telehealth Service (Service), or in the case of a use of the Service by or on behalf of a minor, you and yours refer to and include (i) the parent or legal guardian who provides consent to the use of the Service by such minor or uses the Service on behalf of such minor, and (ii) the minor for whom consent is being provided or on whose behalf the Service is being utilized. When using Service, you will be consulting with your health care providers via the use of Telehealth.   Telehealth involves the delivery of healthcare services using electronic communications, information technology or other means between a healthcare provider and a patient who are not in the same physical location. Telehealth may be used for diagnosis, treatment, follow-up and/or patient education, and may include, but is not limited to, one or more of the following:    Electronic transmission of medical records, photo images, personal health information or other data between a patient and a healthcare provider    Interactions between a patient and healthcare provider via audio, video and/or data communications    Use of output data from medical devices, sound and video files    Anticipated Benefits   The use of Telehealth by your Provider(s) through the Service may have the following possible benefits:    Making it easier and more efficient for you to access medical care and treatment for the conditions treated by such Provider(s) utilizing the Service    Allowing you to obtain medical care and treatment by Provider(s) at times that are convenient for you    Enabling you to interact with Provider(s) without the necessity of an in-office appointment     Possible Risks   While the use of Telehealth can provide potential benefits for you, there are also potential risks associated with the use of Telehealth.  These risks include, but may not be limited to the following:    Your Provider(s) may not able to provide medical treatment for your particular condition and you may be required to seek alternative healthcare or emergency care services.  The electronic systems or other security protocols or safeguards used in the Service could fail, causing a breach of privacy of your medical or other information.  Given regulatory requirements in certain jurisdictions, your Provider(s) diagnosis and/or treatment options, especially pertaining to certain prescriptions, may be limited. Acceptance   1. You understand that Services will be provided via Telehealth. This process involves the use of HIPAA compliant and secure, real-time audio-visual interfacing with a qualified and appropriately trained provider located at [facility]. 2. You understand that, under no circumstances, will this session be recorded. 3. You understand that the Provider(s) at [facility] and other clinical participants will be party to the information obtained during the Telehealth session in accordance with best medical practices. 4. You understand that the information obtained during the Telehealth session will be used to help determine the most appropriate treatment options. 5. You understand that You have the right to revoke this consent at any point in time. 6. You understand that Telehealth is voluntary, and that continued treatment is not dependent upon consent. 7. You understand that, in the event of non-consent to Telehealth services and/or technical difficulties, you will obtain services as typically provided in the absence of Telehealth technology. 8. You understand that this consent will be kept in Your medical record. 9. No potential benefits from the use of Telehealth or specific results can be guaranteed. Your condition may not be cured or improved and, in some cases, may get worse.    10. There are limitations in the provision of medical care and treatment via Telehealth and the Service and you may not be able to receive diagnosis and/or treatment through the Service for every condition for which you seek diagnosis and/or treatment. 11. There are potential risks to the use of Telehealth, including but not limited to the risks described in this Telehealth Consent. 12. Your Provider(s) have discussed the use of Telehealth and the Service with you, including the benefits and risks of such and you have provided oral consent to your Provider(s) for the use of Telehealth and the Service. 15. You understand that it is your duty to provide your Provider(s) truthful, accurate and complete information, including all relevant information regarding care that you may have received or may be receiving from other healthcare providers outside of the Service. 14. You understand that each of your Provider(s) may determine in his or sole discretion that your condition is not suitable for diagnosis and/or treatment using the Service, and that you may need to seek medical care and treatment a specialist or other healthcare provider, outside of the Service. 15. You understand that you are fully responsible for payment for all services provided by Provider(s) or through use of the Service and that you may not be able to use third-party insurance. 16. You represent that (a) you have read this Telehealth Consent carefully, (b) you understand the risks and benefits of the Service and the use of Telehealth in the medical care and treatment provided to you by Provider(s) using the Service, and (c) you have the legal capacity and authority to provide this consent for yourself and/or the minor for which you are consenting under applicable federal and state laws, including laws relating to the age of [de-identified] and/or parental/guardian consent.    17. You give your informed consent to the use of Telehealth by Provider(s) using the Service under the terms described in the Terms of Service and this Telehealth Consent. The patient was read the following statement and has consented to the visit as of 4/26/22. The patient has been scheduled for their first telehealth visit on 4/26/22 with Corin Garcia CNP.

## 2022-04-26 NOTE — LETTER
· Weight loss is also recommended as a long-term intervention. · Complications of RY if not treated were discussed with patient patient to include systemic hypertension, pulmonary hypertension, cardiovascular morbidities, car accidents and all cause mortality. Discussed pathophysiology of RY with patient today  Patient education handout provided regarding sleep tips        If you have questions, please do not hesitate to call me. I look forward to following [facility] along with you.     Sincerely,        VICTORINO Mancilla - CNP    CC providers:    No Recipients

## 2022-04-27 ENCOUNTER — TELEPHONE (OUTPATIENT)
Dept: MAMMOGRAPHY | Age: 47
End: 2022-04-27

## 2022-04-28 NOTE — TELEPHONE ENCOUNTER
Called and spoke to patient. The scheduled appt on 5/18/22 does not work for her. I offered an appt on 5/19/22 and she was not able to make that appt either. I informed patient I will watch for cancellations for an in office appt on a Monday or Thursday. Patient also mentioned to me that the past two mornings she has woke up and her arm has been asleep. She thinks that when asked if this is something that happens to her she may have answered no?

## 2022-04-28 NOTE — TELEPHONE ENCOUNTER
Arms falling asleep at night is not typically a question that I ask. If this is happening she may need to follow-up with her PCP.

## 2022-05-05 ENCOUNTER — HOSPITAL ENCOUNTER (EMERGENCY)
Age: 47
Discharge: HOME | End: 2022-05-05
Attending: STUDENT IN AN ORGANIZED HEALTH CARE EDUCATION/TRAINING PROGRAM
Payer: COMMERCIAL

## 2022-05-05 VITALS
OXYGEN SATURATION: 96 % | HEIGHT: 67 IN | TEMPERATURE: 98.6 F | DIASTOLIC BLOOD PRESSURE: 52 MMHG | WEIGHT: 215 LBS | RESPIRATION RATE: 14 BRPM | SYSTOLIC BLOOD PRESSURE: 117 MMHG | BODY MASS INDEX: 33.74 KG/M2 | HEART RATE: 72 BPM

## 2022-05-05 DIAGNOSIS — H61.892 FOREIGN BODY SENSATION IN EAR CANAL, LEFT: Primary | ICD-10-CM

## 2022-05-05 PROCEDURE — 99283 EMERGENCY DEPT VISIT LOW MDM: CPT

## 2022-05-05 ASSESSMENT — PAIN - FUNCTIONAL ASSESSMENT: PAIN_FUNCTIONAL_ASSESSMENT: NONE - DENIES PAIN

## 2022-05-05 NOTE — ED PROVIDER NOTES
[address] [facility]  Concern for foreign Body in [address] [place] [address]  Aneta Perez is a 40 y.o. female with a past medical history of RY, who presents to the ED complaining of concern for ear foreign body    Onset: Patient states that she woke approximately 1 hour ago with a feeling of a foreign body sensation in her left ear  Duration: Constant but improved  Character: She denies pain, just feels a sensation of something moving around in her ear  Palliative factors: Denies  Provocative factors: Denies  Decreased Hearing: Denies  Patient does use Q-tips, last use yesterday morning. Patient denies any buzzing sensation  She does report that she tried to use her finger to remove any foreign body  Patient denies any other complaints    Old records reviewed: No pertinent information noted. No other complaints, modifying factors or associated symptoms. I have reviewed the following from the nursing documentation. Past Medical History:   Diagnosis Date    Insomnia      Past Surgical History:   Procedure Laterality Date    ENDOMETRIAL BIOPSY      SHOULDER SURGERY Right 11/29/2016     History reviewed. No pertinent family history.   Social History     Socioeconomic History    Marital status:      Spouse name: Not on file    Number of children: Not on file    Years of education: Not on file    Highest education level: Not on file   Occupational History    Not on file   Tobacco Use    Smoking status: Never Smoker    Smokeless tobacco: Never Used   Vaping Use    Vaping Use: Never used   Substance and Sexual Activity    Alcohol use: No     Alcohol/week: 0.0 standard drinks    Drug use: No    Sexual activity: Yes     Partners: Male   Other Topics Concern    Not on file   Social History Narrative    Not on file     Social Determinants of Health     Financial Resource Strain:     Difficulty of Paying Living Expenses: Not on file   Food Insecurity:     Worried About Running Out of Food in the Last Year: Not on file    Alee of Food in the Last Year: Not on file   Transportation Needs:     Lack of Transportation (Medical): Not on file    Lack of Transportation (Non-Medical): Not on file   Physical Activity:     Days of Exercise per Week: Not on file    Minutes of Exercise per Session: Not on file   Stress:     Feeling of Stress : Not on file   Social Connections:     Frequency of Communication with Friends and Family: Not on file    Frequency of Social Gatherings with Friends and Family: Not on file    Attends Scientology Services: Not on file    Active Member of [address] [place] [address] [employer] or Organizations: Not on file    Attends Club or Organization Meetings: Not on file    Marital Status: Not on file   Intimate Partner Violence:     Fear of Current or Ex-Partner: Not on file    Emotionally Abused: Not on file    Physically Abused: Not on file    Sexually Abused: Not on file   Housing Stability:     Unable to Pay for Housing in the Last Year: Not on file    Number of [place] in the Last Year: Not on file    Unstable Housing in the Last Year: Not on file     No current facility-administered medications for this encounter. Current Outpatient Medications   Medication Sig Dispense Refill    rOPINIRole (REQUIP) 0.5 MG tablet Take 1 tablet by mouth nightly Take 1-2 hours before bedtime 30 tablet 1    levonorgestrel (MIRENA, 52 MG,) IUD 52 mg 1 Units by IntraUTERine route      oxybutynin (DITROPAN-XL) 10 MG extended release tablet       cetirizine (ZYRTEC) 10 MG tablet Take 10 mg by mouth daily      FLUoxetine (PROZAC) 20 MG capsule   0     Allergies   Allergen Reactions    Pcn [Penicillins] Hives       REVIEW OF SYSTEMS  All systems reviewed, pertinent positives per HPI otherwise noted to be negative.     PHYSICAL EXAM  BP (!) 117/52   Pulse 72   Temp 98.6 °F (37 °C) (Oral)   Resp 14   Ht 5' 7\" (1.702 m)   Wt 215 lb (97.5 kg)   SpO2 96%   BMI 33.67 kg/m²    GENERAL APPEARANCE: Awake and alert. Cooperative. no distress. Head: Normocephalic. Atraumatic. Mucous membranes are moist  NECK: Supple. Full range of motion of the neck without stiffness or pain. EARS: No evidence of ear foreign body. Tympanic membranes unremarkable. Mastoid is not erythematous, tender, swollen  EYES: PERRL. EOM's grossly intact. HEART/CHEST: RRR. No murmurs. Chest wall is not tender to palpation. LUNGS: Respirations unlabored. CTAB. Good air exchange. Speaking comfortably in full sentences. ABDOMEN: No tenderness. Soft. Non-distended. No masses. No organomegaly. No guarding or rebound. MUSCULOSKELETAL: No extremity edema. Compartments soft. No deformity. No tenderness in the extremities. All extremities neurovascularly intact. SKIN: Warm and dry. No acute rashes. NEUROLOGICAL: Alert and oriented. CN's 2-12 intact. No gross facial drooping. Strength 5/5, sensation intact. PSYCHIATRIC: Normal mood and affect. LABS  I have reviewed all labs for this visit. No results found for this visit on 05/05/22. RADIOLOGY    No orders to display          ED COURSE / MDM  Patient seen and evaluated. Old records reviewed and pertinent information included in HPI. Labs and imaging reviewed and results discussed with patient. Overall well appearing patient, in no acute distress, presenting for ear foreign body sensation. Physical exam remarkable for no appreciable ear foreign body or other abnormality of the ear. Differential diagnosis includes but is not limited to: Resolved foreign body, low suspicion for otitis media, otitis externa, perforated tympanum, bullous myringitis low suspicion for mastoiditis, meningitis    On exam, I did not appreciate any foreign body in the external auditory canal.  Patient does report that symptoms had improved compared to home. Is possible that patient had a foreign body that has resolved.   Did offer irrigation to the patient which she desired. She reports that symptoms continued to be mildly per improved after irrigation. I reevaluated the ear and continues to not see any evidence of foreign body. There is no evidence of otitis media, otitis externa, TM perforation, mastoiditis, or other abnormality on exam.    Evaluation overall reassuring. At this time, feel the patient is appropriate for discharge to follow-up with a primary care doctor. Patient feels comfortable with discharge at this time. Patient was provided with referral to ENT if symptoms continue. Encouraged her not to use Q-tips or stick other objects into her ear. Return precautions given. Encouraged PCP follow-up as needed. Patient discharged in stable condition. CLINICAL IMPRESSION  1. Foreign body sensation in ear canal, left        Blood pressure (!) 117/52, pulse 72, temperature 98.6 °F (37 °C), temperature source Oral, resp. rate 14, height 5' 7\" (1.702 m), weight 215 lb (97.5 kg), SpO2 96 %. DISPOSITION  Harmony Molina was discharged to home in stable condition. Patient was given scripts for the following medications. I counseled patient how to take these medications. Discharge Medication List as of 5/5/2022  3:37 AM          Follow-up with:  Jin Vegas DO  [address] [facility]  [address]  [address] [place] [address] [id number]    Schedule an appointment as soon as possible for a visit   As needed    Gennaro Dakins, MD  [address] [facility] [address] [place] [address] [facility]  [phone]    Schedule an appointment as soon as possible for a visit   As needed    [place] [id number] [facility]  [address]  [phone]  Go to   As needed, If symptoms worsen      DISCLAIMER: This chart was created using Dragon dictation software. Efforts were made by me to ensure accuracy, however some errors may be present due to limitations of this technology and occasionally words are not transcribed correctly. Halima Montes MD  05/05/22 [id number]

## 2022-05-05 NOTE — ED NOTES
Pt's left ear irrigated per request, felt relief and ear is clean to visualization. MD updated, will continue to monitor. Rene Chandra RN.        Sung King RN  05/05/22 [id number]

## 2022-05-13 NOTE — TELEPHONE ENCOUNTER
There is a cancellation Monday 5/16/22 at [place]. Patient called with message left for patient to call back to office.

## 2022-05-16 NOTE — TELEPHONE ENCOUNTER
Johnnie no cancellations. Spoke with Mitzi Madera and she said we can add patient on 5/26/22 at 2pm    Patient called with message left for patient to call back to office.

## 2022-05-20 NOTE — TELEPHONE ENCOUNTER
Spoke with patient and she can not make the appt on 5/26/22.  Patient scheduled for 6/16/22 as there was a cancellation

## 2022-05-24 RX ORDER — ROPINIROLE 0.5 MG/1
TABLET, FILM COATED ORAL
Qty: 30 TABLET | Refills: 0 | OUTPATIENT
Start: 2022-05-24

## 2022-05-24 NOTE — TELEPHONE ENCOUNTER
Pt LVM stating that she can come Thursday at 2 PM if appt is still available. Returned call and scheduled pt.

## 2022-05-26 ENCOUNTER — TELEPHONE (OUTPATIENT)
Dept: PULMONOLOGY | Age: 47
End: 2022-05-26

## 2022-05-26 ENCOUNTER — OFFICE VISIT (OUTPATIENT)
Dept: SLEEP MEDICINE | Age: 47
End: 2022-05-26
Payer: COMMERCIAL

## 2022-05-26 VITALS
BODY MASS INDEX: 33.49 KG/M2 | WEIGHT: 213.4 LBS | SYSTOLIC BLOOD PRESSURE: 97 MMHG | RESPIRATION RATE: 15 BRPM | TEMPERATURE: 99 F | DIASTOLIC BLOOD PRESSURE: 63 MMHG | HEART RATE: 82 BPM | HEIGHT: 67 IN | OXYGEN SATURATION: 98 %

## 2022-05-26 DIAGNOSIS — E66.9 OBESITY (BMI 30-39.9): ICD-10-CM

## 2022-05-26 DIAGNOSIS — G47.10 HYPERSOMNIA: ICD-10-CM

## 2022-05-26 DIAGNOSIS — R53.83 OTHER FATIGUE: ICD-10-CM

## 2022-05-26 DIAGNOSIS — G25.81 RLS (RESTLESS LEGS SYNDROME): ICD-10-CM

## 2022-05-26 DIAGNOSIS — G47.33 MILD OBSTRUCTIVE SLEEP APNEA: Primary | ICD-10-CM

## 2022-05-26 PROCEDURE — 99214 OFFICE O/P EST MOD 30 MIN: CPT | Performed by: NURSE PRACTITIONER

## 2022-05-26 RX ORDER — ROPINIROLE 1 MG/1
1 TABLET, FILM COATED ORAL NIGHTLY
Qty: 30 TABLET | Refills: 3 | Status: SHIPPED | OUTPATIENT
Start: 2022-05-26 | End: 2022-06-02 | Stop reason: SDUPTHER

## 2022-05-26 ASSESSMENT — SLEEP AND FATIGUE QUESTIONNAIRES
HOW LIKELY ARE YOU TO NOD OFF OR FALL ASLEEP WHILE LYING DOWN TO REST IN THE AFTERNOON WHEN CIRCUMSTANCES PERMIT: 3
NECK CIRCUMFERENCE (INCHES): 15.25
HOW LIKELY ARE YOU TO NOD OFF OR FALL ASLEEP WHILE SITTING QUIETLY AFTER LUNCH WITHOUT ALCOHOL: 1
HOW LIKELY ARE YOU TO NOD OFF OR FALL ASLEEP IN A CAR, WHILE STOPPED FOR A FEW MINUTES IN TRAFFIC: 2
HOW LIKELY ARE YOU TO NOD OFF OR FALL ASLEEP WHILE SITTING INACTIVE IN A PUBLIC PLACE: 0
HOW LIKELY ARE YOU TO NOD OFF OR FALL ASLEEP WHILE WATCHING TV: 1
ESS TOTAL SCORE: 13
HOW LIKELY ARE YOU TO NOD OFF OR FALL ASLEEP WHILE SITTING AND READING: 3
HOW LIKELY ARE YOU TO NOD OFF OR FALL ASLEEP WHILE SITTING AND TALKING TO SOMEONE: 0
HOW LIKELY ARE YOU TO NOD OFF OR FALL ASLEEP WHEN YOU ARE A PASSENGER IN A CAR FOR AN HOUR WITHOUT A BREAK: 3

## 2022-05-26 NOTE — PROGRESS NOTES
Patient ID: Abhay Keating is a 40 y.o. female who is being seen today for   Chief Complaint   Patient presents with    Sleep Apnea     4-6 week medication follow up      Referring: Dr. Gina Rocha    HPI:     Abhay Keating is a 40 y.o. female in office for RY/RLS/fatigue follow up. She started Requip after last visit. STates requip helps her fall asleep but she still feels some restlessness. Also still wakes through the night and does not feel rested in the mornings. She denies negative side effects of medication    Patient is using CPAP 8 hrs/night. Using humidifier. No snoring on CPAP. The pressure is well tolerated. The mask is comfortable nasal. No mask leak. +daytime sleepiness. Denies nodding off when driving. No dry nose or throat. +fatigue. Bedtime is  pm and rise time is 6-630 am. Sleep onset is few minutes. Wakes up 3-4 times at night total. No nocturia. It takes usually minutes to fall back a sleep. No naps during the day,k no time to nap. Occasional headache in am. No weight gain. 1-2 caffienated beverages during the day, done at lunch. Occasional alcohol. ESS is 13          Previous HPI 4/26/22  Abhay Keating is a 40 y.o. female for televideo appointment via video and audio doxy. me virtual visit for RY follow up. States pressure change  Patient is using CPAP 8-9 hrs/night. Using humidifier. No snoring on CPAP. The pressure is well tolerated. The mask is comfortable-nasal. No mask leak. +daytime sleepiness. States went to PCP and states did not really find other causes of fatigue. Denies nodding off when driving. No dry nose or throat. No fatigue. Bedtime is  pm and rise time is 530-6 am. Sleep onset is usually few minutes. Wakes up 4-5 times at night total, feels restless. 0-1 nocturia. It takes usually few minutes to fall back a sleep. No naps during the day. No headache in am. No weight gain. 2 caffienated beverages during the day. Occassioanl alcohol.  ESS is 10.    Patient still having daytime sleepiness despite adequate treatment of sleep apnea. She does state has some restlessness at night and aching in legs. She denies cataplexy. She denies sleep paralysis. She denies hallucinations when falling or waking from sleep. States she has also discussed other causes of fatigue with PCP. Previous HPI 3/14/22  Jose Maria Kamara is a 40 y.o. female in office for RY follow up. PSG and CPAP titration were reviewed by me and noted below. Results were dicussed with patient and multiple good questions were answered. PSG showed mild RY and patient started CPAP after testing. States she is doing okay with CPAP. States does feel a little better when waking, but is still fatigued. Patient is using CPAP  8-9 hrs/night. Using humidifier. No snoring on CPAP. The pressure is well tolerated. The mask is comfortable- nasal mask. No mask leak. +daytime sleepiness. Denies nodding off when driving. No dry nose or throat. +fatigue. Bedtime is  pm and rise time is 530-6 am. Sleep onset is few minutes. Wakes up 2-3 times at night total. No nocturia. It takes usually few minutes to fall back a sleep. Rare naps during the day. No headache in am. No weight gain. 1-2 caffienated beverages during the day. Occasional alcohol. ESS is 12      No hallucinations when falling or waking from sleep  No cataplexy  No sleep paralysis  Some RLS symptoms but better since starting CPAP. Initial HPI 11/15/21  Jose Maria Kamara is a 40 y.o. female in office for snoring evaluation. States she is tired all of the time. Patient reports snoring at night for the past 10+ years, worse recently. Unsure if worse in supine position. Wakes self snoring. No known witnessed apnea. No restorative sleep. +dry mouth upon awakening. Fatigue and tiredness during the day. No history of sleep apnea-states has had a sleep study previously- mild maybe? States no treatment recommended at that time.  Bedtime  pm and rise time is 530-7 am. It takes usually few minutes to fall asleep. 0-1 nocturia. Wakes up 6+ times at night. It takes few- unknown minutes to fall back a sleep. Takes occasional nap during the day. No headache in am. No car wrecks or near wrecks because of the sleepiness. No nodding off while driving but can get sleepy at times. Gained 60 pounds in the past 2-3 years. +forgetfulness and decreased concentration. No nasal congestion at night. Drinks 1-2 caffinated beverages per day. Occasional alcohol. +restless feelings in legs at night. No loss of muscle strength when angry or laugh. No hallucination when dozing off or waking up from sleep. No paralysis upon awakening from sleep or going to sleep.  +teeth grinding. No nightmares. No sleep walking. No night time panic attacks. No narcotics. No drug abuse. +history of depression, +history of anxiety states feels pretty well controlled. No history of atrial fibrillation. No history of DM. No history of HTN. No history of ischemic heart disease. No history of stroke. ESS is 14. No smoking. No known FH for RLS or narcolepsy. +FH for RY- [facility]     Sleep Medicine 5/26/2022 4/26/2022 3/14/2022 11/15/2021   Sitting and reading 3 1 1 2   Watching TV 1 1 1 2   Sitting, inactive in a public place (e.g. a theatre or a meeting) 0 0 0 1   As a passenger in a car for an hour without a break 3 3 3 3   Lying down to rest in the afternoon when circumstances permit 3 3 3 3   Sitting and talking to someone 0 0 1 1   Sitting quietly after a lunch without alcohol 1 1 1 1   In a car, while stopped for a few minutes in traffic 2 1 2 1   Total score 13 10 12 14   Neck circumference (Inches) 15.25 - 13 13.25       Past Medical History:  Past Medical History:   Diagnosis Date    Insomnia        Past Surgical History:        Procedure Laterality Date    ENDOMETRIAL BIOPSY      SHOULDER SURGERY Right 11/29/2016       Allergies:  is allergic to pcn [penicillins].   Social History:    TOBACCO:   reports that she has never smoked. She has never used smokeless tobacco.  ETOH:   reports no history of alcohol use. Family History:   History reviewed. No pertinent family history. Current Medications:    Current Outpatient Medications:     rOPINIRole (REQUIP) 0.5 MG tablet, Take 1 tablet by mouth nightly Take 1-2 hours before bedtime, Disp: 30 tablet, Rfl: 1    levonorgestrel (MIRENA, 52 MG,) IUD 52 mg, 1 Units by IntraUTERine route, Disp: , Rfl:     oxybutynin (DITROPAN-XL) 10 MG extended release tablet, , Disp: , Rfl:     cetirizine (ZYRTEC) 10 MG tablet, Take 10 mg by mouth daily, Disp: , Rfl:     FLUoxetine (PROZAC) 20 MG capsule, , Disp: , Rfl: 0    Review of Systems        Objective:   PHYSICAL EXAM:  BP 97/63   Pulse 82   Temp 99 °F (37.2 °C)   Resp 15   Ht 5' 7\" (1.702 m)   Wt 213 lb 6.4 oz (96.8 kg)   SpO2 98% Comment: RA  BMI 33.42 kg/m²     Physical Exam  Gen: No acute distress. Obese. BMI of 33.42  Eyes: PERRL. No sclera icterus. No conjunctival injection. ENT: No discharge. Neck: Trachea midline. No obvious mass. Neck circumference 15.25\"  Resp: No accessory muscle use. No crackles. No wheezes. No rhonchi. CV: Regular rate. Regular rhythm. No murmur or rub. Skin: Warm and dry. M/S: No cyanosis. No obvious joint deformity. Neuro: Awake. Alert. Moves all four extremities. Psych: Oriented x 3. No anxiety. DATA:   12/6/2021 PSG AHI 9/REM AHI 38.3, low SPO2 88%  12/27/2021 titration improved RY with CPAP, treatment emergent CSA. Recommendations trial of auto CPAP 6-12 cm H2O    CPAP download data:   Compliance download report from 2/9/22 to 3/10/22 reviewed today by me and showed patient is using machine 8:37 hrs/night with 97% compliance and AHI 1.6 within this time frame. 29/30days with greater than 4 hours of machine use.   90% pressure 9.2 cm H20 on auto CPAP 6-12 cm H2O     Compliance download report from 3/23/22 to 4/21/22 reviewed today by me and showed patient is using machine 8:27 hrs/night with 100% compliance and AHI 1.4 within this time frame. 30/30days with greater than 4 hours of machine use. 90% pressure 10.6 cm H20 on auto CPAP 6-12 cm H2O (report showing 6-12 cm H2O as current pressure however in [facility] pressure is showing as auto CPAP 9-13 cm H2O)      Compliance download report from 4/26/22 to 5/25/22 reviewed today by me and showed patient is using machine 7:57 hrs/night with 28% compliance and AHI 1.4 within this time frame. 28/30days with greater than 4 hours of machine use. 90% pressure 10.8 cm H20 on auto CPAP 6-12 cm H2O (report showing 6-12 cm H2O as current pressure however in [facility] pressure is showing as auto CPAP 9-13 cm H2O)    Assessment:       · Mild RY. Auto CPAP 9-13 cm H2O? Optimal compliance on review today  · Snoring-resolved on CPAP  · Hypersomnia   · Sleep maintenance insomnia  · RLS  · Obesity        Plan:      · Send order to change to Auto CPAP 1115 cm H2O   · Trial increase to Requip 1 mg nightly. The side effects of ropinirole have been discussed with the patient and or family, including but not limited to compulsive behaviors, mental status changes, sleep attacks and cardiovascular side effects. As with all medications, patient was instructed to read package insert & to call with any concerning side effects. · Patient to follow-up with PCP for other possible causes of fatigue and tiredness  · Could consider MSLT if no improvement in sleepiness if no improvement with above  Advised to use CPAP 6-8 hrs at night and during naps. Replacement of mask, tubing, head straps every 3-6 months or sooner if damaged. Patient instructed to contact [employer] for any mask, tubing or machine trouble shooting if problems arise.   · Sleep hygiene  · D/W patient non pharmacological therapy for RLS including avoiding aggravating factors (sleep deprivation, mental alerting activities at time of rest, antihistamines), moderate regular exercise, leg message and heating pads/hot shower. · Cognitive behavioral therapy was discussed with patient including stimulus control and sleep restriction   · Avoid sedatives, alcohol and caffeinated drinks at bed time. · No driving motorized vehicles or operating heavy machinery while fatigue, drowsy or sleepy-patient verbalized understanding and agrees. · Weight loss is also recommended as a long-term intervention. · Complications of RY if not treated were discussed with patient patient to include systemic hypertension, pulmonary hypertension, cardiovascular morbidities, car accidents and all cause mortality. Discussed pathophysiology of RY with patient today  Patient education handout provided regarding sleep tips     Follow-up: 2-3 months, sooner if needed    Varsha Elizabeth, was evaluated through a synchronous (real-time) audio-video encounter. The patient (or guardian if applicable) is aware that this is a billable service, which includes applicable co-pays. This Virtual Visit was conducted with patient's (and/or legal guardian's) consent. The visit was conducted pursuant to the emergency declaration under the [address] [place] [address], [address] [place] [address] waiver authority and the [facility] and [employer] General Act. Patient identification was verified, and a caregiver was present when appropriate. The patient was located at home in a state where the provider was licensed to provide care. Total time spent for this encounter: Not billed by time    --VICTORINO Mancilla CNP on 5/26/2022 at 2:33 PM    An electronic signature was used to authenticate this note.

## 2022-05-26 NOTE — TELEPHONE ENCOUNTER
Please watch for pressure to be changed. Will need new report 30 days after.      **patients machine does not reflect pressure change on report**

## 2022-05-26 NOTE — LETTER
[facility]  [address] [facility]  [address]  Phone: [phone]  Fax: [phone]           VICTORINO Antony CNP      May 26, 2022     Patient: Mak Cifuentes   MR Number: [id number]   YOB: 1975   Date of Visit: 5/26/2022       Dear Dr. Cervantes Mercury: Thank you for referring Kirill Hunter to me for evaluation/treatment. Below are the relevant portions of my assessment and plan of care. Compliance download report from 4/26/22 to 5/25/22 reviewed today by me and showed patient is using machine 7:57 hrs/night with 28% compliance and AHI 1.4 within this time frame. 28/30days with greater than 4 hours of machine use. 90% pressure 10.8 cm H20 on auto CPAP 6-12 cm H2O (report showing 6-12 cm H2O as current pressure however in [employer] pressure is showing as auto CPAP 9-13 cm H2O)    Assessment:       · Mild RY. Auto CPAP 9-13 cm H2O? Optimal compliance on review today  · Snoring-resolved on CPAP  · Hypersomnia   · Sleep maintenance insomnia  · RLS  · Obesity        Plan:      · Send order to change to Auto CPAP 1115 cm H2O   · Trial increase to Requip 1 mg nightly. The side effects of ropinirole have been discussed with the patient and or family, including but not limited to compulsive behaviors, mental status changes, sleep attacks and cardiovascular side effects. As with all medications, patient was instructed to read package insert & to call with any concerning side effects. · Patient to follow-up with PCP for other possible causes of fatigue and tiredness  · Could consider MSLT if no improvement in sleepiness if no improvement with above  Advised to use CPAP 6-8 hrs at night and during naps. Replacement of mask, tubing, head straps every 3-6 months or sooner if damaged. Patient instructed to contact [employer] company for any mask, tubing or machine trouble shooting if problems arise.   · Sleep hygiene  · D/W patient non pharmacological therapy for RLS including avoiding aggravating factors (sleep deprivation, mental alerting activities at time of rest, antihistamines), moderate regular exercise, leg message and heating pads/hot shower. · Cognitive behavioral therapy was discussed with patient including stimulus control and sleep restriction   · Avoid sedatives, alcohol and caffeinated drinks at bed time. · No driving motorized vehicles or operating heavy machinery while fatigue, drowsy or sleepy-patient verbalized understanding and agrees. · Weight loss is also recommended as a long-term intervention. · Complications of RY if not treated were discussed with patient patient to include systemic hypertension, pulmonary hypertension, cardiovascular morbidities, car accidents and all cause mortality. Discussed pathophysiology of RY with patient today  Patient education handout provided regarding sleep tips    If you have questions, please do not hesitate to call me. I look forward to following Ha García along with you.     Sincerely,        VICTORINO Vora - CNP    CC providers:    No Recipients

## 2022-05-26 NOTE — PATIENT INSTRUCTIONS
Absolutely no driving if sleepy. It is your responsibility not to drive if fatigued, tired, or sleepy         Sleep Hygiene. .. Tips for better sleep. .. Avoid naps. This will ensure you are sleepy at bedtime. If you have to take a nap, sleep less than 1 hour, before 3 pm.  Sleep only when sleepy; this reduces the time you are awake in bed. Regular exercise is recommended to help you deepen your sleep, but not within 4-6 hours of your bedtime. Timing of exercise is important, aim to exercise early in the morning or early afternoon. A light snack may help you fall asleep. Warm milk and foods high in the amino acid tryptophan, such as bananas, may help you to sleep  Be sure to avoid heavy, spicy or sugary foods 4-6 hours before bedtime and avoid at snack time. Stay away from stimulants such as caffeine and nicotine for at least 4-6 hours before bed. Stimulants can interfere with your ability to fall asleep. Caffeine is found in tea, cola, coffee, cocoa and chocolate and is best avoided at bedtime. Nicotine is found in tobacco products. Avoid alcohol 4-6 hours before bedtime. Alcohol has an immediate sleep-inducing effect, after a few hours when alcohol levels fall there is a stimulant or wake-up effect and will cause fragmented sleep. Sleep rituals are important. Give your body clues it is time to slow down and sleep. Examples include; yoga, deep breathing, listen to relaxing music, a hot bath or a few minutes of reading. Have a fixed bedtime and awakening time, Even on weekends! You will feel better keeping a regular sleep cycle, even if you are retired or not working. Get into your favorite sleep position. If not asleep in 30 minutes, get up and do something boring until you feel sleepy. Remember not to expose yourself to bright lights such as TV, phone or tablet screens. Only use your bed for sleeping. Do not use your bed as an office, workroom or recreation room. Use comfortable bedding. Uncomfortable bedding can prevent good sleep. Ensure your bedroom is quiet and comfortable. A cooler room along with enough blankets to stay warm is recommended. If your room is too noisy, try a white noise machine. If too bright, try black out shades or an eye mask. Dont take worries to bed. Leave worries about work, school etc. behind you when you go to bed. Some people find it helpful to assign a worry period in the evening or late afternoon to write down your worries and get them out of your system. CPAP Equipment Cleaning and Disinfecting Schedule  Equipment Cleaning Frequency Instructions  Disinfecting Frequency   Non-Disposable Filters  Weekly Mild soapy water, Rinse, Air Dry Not Required   Disposable Filters Change as needed  2-4 weeks Do Not Wash Not Required   Hose/tubing Daily Mild soapy water, Rinse, Air Dry Once a week   Mask / Nasal Pillows Daily Mild soapy water, Rinse, Air Dry Once a week   Headgear Weekly Hand wash, Mild soapy water, Rinse, Dry  Not Required   Humidifier Daily Empty water daily  Mild soapy water, Rinse well, Air Dry  Once a week   CPAP Unit As Needed Dust with damp cloth,  No detergents or sprays Not Required         Disinfect (per schedule) with 1 part white vinegar and 3 parts water- soak mask and water chamber for 30 minutes every 1-2 weeks, more often if sick. Allow water/vinegar mixture to run through tubing. Allow all equipment to air dry. Drying Hints:   Always hang tubing away from direct sunlight, as this will cause the tubing to become yellow, brittle and crack over a period of time. DO NOT attach the wet tubing to your CPAP unit to blow-dry it. The moisture from the tubing can drain back into your machine. Moisture in your unit can cause sudden pressure increases or short circuits  DO's and DON'Ts:  - Don't use alcohol-based products to clean your mask, because it can cause the materials to become hard and brittle.    - Don't put headgear in the washer or dryer  - Don't use any caustic or household cleaning solutions such as bleach on your CPAP   equipment.  - Do follow the recommended cleaning schedule. - Do change your disposable filter frequently. Adapted From: [email or web address]. shtm.   These are general suggestions for all models please follow specific s recommendations and specific instructions

## 2022-06-02 ENCOUNTER — TELEPHONE (OUTPATIENT)
Dept: PULMONOLOGY | Age: 47
End: 2022-06-02

## 2022-06-02 RX ORDER — ROPINIROLE 1 MG/1
1 TABLET, FILM COATED ORAL NIGHTLY
Qty: 90 TABLET | Refills: 0 | Status: SHIPPED | OUTPATIENT
Start: 2022-06-02 | End: 2022-08-29 | Stop reason: SINTOL

## 2022-06-02 NOTE — TELEPHONE ENCOUNTER
Received prescription request for 90-day supply of Requip. Order placed in epic and signed.   Please verify with patient

## 2022-06-30 NOTE — TELEPHONE ENCOUNTER
CPAP download report from 5/29/2022 - 6/27/2022 on auto CPAP 11 to 15 cm H2O reviewed. Compliance is good 100%. AHI is good 1.1.

## 2022-08-29 ENCOUNTER — OFFICE VISIT (OUTPATIENT)
Dept: PULMONOLOGY | Age: 47
End: 2022-08-29
Payer: COMMERCIAL

## 2022-08-29 ENCOUNTER — HOSPITAL ENCOUNTER (OUTPATIENT)
Age: 47
Discharge: HOME | End: 2022-08-29
Payer: COMMERCIAL

## 2022-08-29 VITALS
WEIGHT: 224 LBS | HEART RATE: 68 BPM | HEIGHT: 67 IN | RESPIRATION RATE: 20 BRPM | OXYGEN SATURATION: 98 % | SYSTOLIC BLOOD PRESSURE: 120 MMHG | DIASTOLIC BLOOD PRESSURE: 74 MMHG | BODY MASS INDEX: 35.16 KG/M2

## 2022-08-29 DIAGNOSIS — G47.33 MILD OBSTRUCTIVE SLEEP APNEA: Primary | ICD-10-CM

## 2022-08-29 DIAGNOSIS — G47.10 HYPERSOMNIA: ICD-10-CM

## 2022-08-29 DIAGNOSIS — G25.81 RLS (RESTLESS LEGS SYNDROME): ICD-10-CM

## 2022-08-29 DIAGNOSIS — F51.04 PSYCHOPHYSIOLOGICAL INSOMNIA: ICD-10-CM

## 2022-08-29 DIAGNOSIS — G47.33 MILD OBSTRUCTIVE SLEEP APNEA: ICD-10-CM

## 2022-08-29 DIAGNOSIS — E66.9 OBESITY (BMI 30-39.9): ICD-10-CM

## 2022-08-29 DIAGNOSIS — Z71.89 CPAP USE COUNSELING: ICD-10-CM

## 2022-08-29 DIAGNOSIS — R53.83 OTHER FATIGUE: ICD-10-CM

## 2022-08-29 PROCEDURE — 36415 COLL VENOUS BLD VENIPUNCTURE: CPT

## 2022-08-29 PROCEDURE — 99214 OFFICE O/P EST MOD 30 MIN: CPT | Performed by: NURSE PRACTITIONER

## 2022-08-29 PROCEDURE — 82728 ASSAY OF FERRITIN: CPT

## 2022-08-29 RX ORDER — PRAMIPEXOLE DIHYDROCHLORIDE 0.12 MG/1
0.12 TABLET ORAL NIGHTLY
Qty: 30 TABLET | Refills: 2 | Status: SHIPPED | OUTPATIENT
Start: 2022-08-29

## 2022-08-29 ASSESSMENT — SLEEP AND FATIGUE QUESTIONNAIRES
HOW LIKELY ARE YOU TO NOD OFF OR FALL ASLEEP WHILE SITTING QUIETLY AFTER LUNCH WITHOUT ALCOHOL: 1
HOW LIKELY ARE YOU TO NOD OFF OR FALL ASLEEP WHILE WATCHING TV: 1
HOW LIKELY ARE YOU TO NOD OFF OR FALL ASLEEP IN A CAR, WHILE STOPPED FOR A FEW MINUTES IN TRAFFIC: 0
HOW LIKELY ARE YOU TO NOD OFF OR FALL ASLEEP WHILE SITTING AND READING: 1
ESS TOTAL SCORE: 9
HOW LIKELY ARE YOU TO NOD OFF OR FALL ASLEEP WHILE SITTING AND TALKING TO SOMEONE: 0
NECK CIRCUMFERENCE (INCHES): 15.25
HOW LIKELY ARE YOU TO NOD OFF OR FALL ASLEEP WHILE LYING DOWN TO REST IN THE AFTERNOON WHEN CIRCUMSTANCES PERMIT: 3
HOW LIKELY ARE YOU TO NOD OFF OR FALL ASLEEP WHEN YOU ARE A PASSENGER IN A CAR FOR AN HOUR WITHOUT A BREAK: 3
HOW LIKELY ARE YOU TO NOD OFF OR FALL ASLEEP WHILE SITTING INACTIVE IN A PUBLIC PLACE: 0

## 2022-08-29 NOTE — PROGRESS NOTES
dry nose or throat. +fatigue. Bedtime is  pm and rise time is 530-6 am. Sleep onset is few minutes. Wakes up 2-3 times at night total. No nocturia. It takes usually few minutes to fall back a sleep. Rare naps during the day. No headache in am. No weight gain. 1-2 caffienated beverages during the day. Occasional alcohol. ESS is 12      No hallucinations when falling or waking from sleep  No cataplexy  No sleep paralysis  Some RLS symptoms but better since starting CPAP. Initial HPI 11/15/21  Maura Aguilar is a 56 y.o. female in office for snoring evaluation. States she is tired all of the time. Patient reports snoring at night for the past 10+ years, worse recently. Unsure if worse in supine position. Wakes self snoring. No known witnessed apnea. No restorative sleep. +dry mouth upon awakening. Fatigue and tiredness during the day. No history of sleep apnea-states has had a sleep study previously- mild maybe? States no treatment recommended at that time. Bedtime  pm and rise time is 530-7 am. It takes usually few minutes to fall asleep. 0-1 nocturia. Wakes up 6+ times at night. It takes few- unknown minutes to fall back a sleep. Takes occasional nap during the day. No headache in am. No car wrecks or near wrecks because of the sleepiness. No nodding off while driving but can get sleepy at times. Gained 60 pounds in the past 2-3 years. +forgetfulness and decreased concentration. No nasal congestion at night. Drinks 1-2 caffinated beverages per day. Occasional alcohol. +restless feelings in legs at night. No loss of muscle strength when angry or laugh. No hallucination when dozing off or waking up from sleep. No paralysis upon awakening from sleep or going to sleep.  +teeth grinding. No nightmares. No sleep walking. No night time panic attacks. No narcotics. No drug abuse. +history of depression, +history of anxiety states feels pretty well controlled. No history of atrial fibrillation.  No history of DM. No history of HTN. No history of ischemic heart disease. No history of stroke. ESS is 14. No smoking. No known FH for RLS or narcolepsy. +FH for RY- mom     Sleep Medicine 8/29/2022 5/26/2022 4/26/2022 3/14/2022 11/15/2021   Sitting and reading 1 3 1 1 2   Watching TV 1 1 1 1 2   Sitting, inactive in a public place (e.g. a theatre or a meeting) 0 0 0 0 1   As a passenger in a car for an hour without a break 3 3 3 3 3   Lying down to rest in the afternoon when circumstances permit 3 3 3 3 3   Sitting and talking to someone 0 0 0 1 1   Sitting quietly after a lunch without alcohol 1 1 1 1 1   In a car, while stopped for a few minutes in traffic 0 2 1 2 1   [place] Sleepiness Score 9 13 10 12 14   Neck circumference (Inches) 15.25 15.25 - 13 13.25       Past Medical History:  Past Medical History:   Diagnosis Date    Insomnia        Past Surgical History:        Procedure Laterality Date    ENDOMETRIAL BIOPSY      SHOULDER SURGERY Right 11/29/2016       Allergies:  is allergic to pcn [penicillins]. Social History:    TOBACCO:   reports that she has never smoked. She has never used smokeless tobacco.  ETOH:   reports no history of alcohol use. Family History:   No family history on file.     Current Medications:    Current Outpatient Medications:     levonorgestrel (MIRENA, 52 MG,) IUD 52 mg, 1 Units by IntraUTERine route, Disp: , Rfl:     oxybutynin (DITROPAN-XL) 10 MG extended release tablet, , Disp: , Rfl:     cetirizine (ZYRTEC) 10 MG tablet, Take 10 mg by mouth daily, Disp: , Rfl:     FLUoxetine (PROZAC) 20 MG capsule, , Disp: , Rfl: 0    rOPINIRole (REQUIP) 1 MG tablet, Take 1 tablet by mouth nightly Take 1-2 hours before bedtime (Patient not taking: Reported on 8/29/2022), Disp: 90 tablet, Rfl: 0    Review of Systems        Objective:   PHYSICAL EXAM:  /74   Pulse 68   Resp 20   Ht 5' 7\" (1.702 m)   Wt 224 lb (101.6 kg)   SpO2 98% Comment: on RA  BMI 35.08 kg/m²     Physical Exam  Gen: No acute distress. Obese. BMI of 35.08  Eyes: PERRL. No sclera icterus. No conjunctival injection. ENT: No discharge. Neck: Trachea midline. No obvious mass. Neck circumference 15.25\"  Resp: No accessory muscle use. No crackles. No wheezes. No rhonchi. CV: Regular rate. Regular rhythm. No murmur or rub. Skin: Warm and dry. M/S: No cyanosis. No obvious joint deformity. Neuro: Awake. Alert. Moves all four extremities. Psych: Oriented x 3. No anxiety. DATA:   12/6/2021 PSG AHI 9/REM AHI 38.3, low SPO2 88%  12/27/2021 titration improved RY with CPAP, treatment emergent CSA. Recommendations trial of auto CPAP 6-12 cm H2O    CPAP download data:   Compliance download report from 2/9/22 to 3/10/22 reviewed today by me and showed patient is using machine 8:37 hrs/night with 97% compliance and AHI 1.6 within this time frame. 29/30days with greater than 4 hours of machine use. 90% pressure 9.2 cm H20 on auto CPAP 6-12 cm H2O     Compliance download report from 3/23/22 to 4/21/22 reviewed today by me and showed patient is using machine 8:27 hrs/night with 100% compliance and AHI 1.4 within this time frame. 30/30days with greater than 4 hours of machine use. 90% pressure 10.6 cm H20 on auto CPAP 6-12 cm H2O (report showing 6-12 cm H2O as current pressure however in [facility] pressure is showing as auto CPAP 9-13 cm H2O)      Compliance download report from 4/26/22 to 5/25/22 reviewed today by me and showed patient is using machine 7:57 hrs/night with 28% compliance and AHI 1.4 within this time frame. 28/30days with greater than 4 hours of machine use.   90% pressure 10.8 cm H20 on auto CPAP 6-12 cm H2O (report showing 6-12 cm H2O as current pressure however in [facility] pressure is showing as auto CPAP 9-13 cm H2O)    Compliance download report from 7/25/22 to 8/23/22 reviewed today by me and showed patient is using machine 8:42 hrs/night with 97% compliance and AHI 1.2 within this time frame. 29/30days with greater than 4 hours of machine use. 90% pressure 11.9 cm H20 on auto CPAP 11-15 cm H2O    Assessment:       Mild RY. Auto CPAP 11-15 cm H2O. Optimal compliance on review today  Snoring-resolved on CPAP  Hypersomnia   Sleep maintenance insomnia  RLS  Obesity  Depression-followed by PCP/therapist        Plan:      Send order to change to Auto CPAP 11-15 cm H2O   Trial Mirapex 0.125 mg. The side effects of mirapex have been discussed with the patient, including but not limited to compulsive behaviors, mental status changes, sleep attacks and cardiovascular side effects. As with all medications, patient was instructed to read package insert & to call with any concerning side effects. Patient to follow-up with PCP for other possible causes of fatigue and tiredness  Continue to follow closely with PCP/therapist  Could consider titration with MSLT to follow if no improvement in sleepiness if no improvement with above  Advised to use CPAP 6-8 hrs at night and during naps. Replacement of mask, tubing, head straps every 3-6 months or sooner if damaged. Patient instructed to contact [employer] company for any mask, tubing or machine trouble shooting if problems arise. Sleep hygiene  D/W patient non pharmacological therapy for RLS including avoiding aggravating factors (sleep deprivation, mental alerting activities at time of rest, antihistamines), moderate regular exercise, leg message and heating pads/hot shower. Ferritin level. Iron deficiency has been considered a possible cause of RLS and giving iron replacement for ferritin <50-70 can be beneficial.    Cognitive behavioral therapy was discussed with patient including stimulus control and sleep restriction   Avoid sedatives, alcohol and caffeinated drinks at bed time. No driving motorized vehicles or operating heavy machinery while fatigue, drowsy or sleepy-patient verbalized understanding and agrees.    Weight loss is also recommended as a long-term intervention. Complications of RY if not treated were discussed with patient patient to include systemic hypertension, pulmonary hypertension, cardiovascular morbidities, car accidents and all cause mortality.   Discussed pathophysiology of RY with patient today  Patient education handout provided regarding sleep tips     Follow-up: 2-3 months, sooner if needed

## 2022-08-29 NOTE — PATIENT INSTRUCTIONS
Absolutely no driving if sleepy. It is your responsibility not to drive if fatigued, tired, or sleepy     Never drive if you are feeling sleepy    Sleep Hygiene. .. Tips for better sleep. .. Avoid naps. This will ensure you are sleepy at bedtime. If you have to take a nap, sleep less than 1 hour, before 3 pm.  Sleep only when sleepy; this reduces the time you are awake in bed. Regular exercise is recommended to help you deepen your sleep, but not within 4-6 hours of your bedtime. Timing of exercise is important, aim to exercise early in the morning or early afternoon. A light snack may help you fall asleep. Warm milk and foods high in the amino acid tryptophan, such as bananas, may help you to sleep  Be sure to avoid heavy, spicy or sugary foods 4-6 hours before bedtime and avoid at snack time. Stay away from stimulants such as caffeine and nicotine for at least 4-6 hours before bed. Stimulants can interfere with your ability to fall asleep. Caffeine is found in tea, cola, coffee, cocoa and chocolate and is best avoided at bedtime. Nicotine is found in tobacco products. Avoid alcohol 4-6 hours before bedtime. Alcohol has an immediate sleep-inducing effect, after a few hours when alcohol levels fall there is a stimulant or wake-up effect and will cause fragmented sleep. Sleep rituals are important. Give your body clues it is time to slow down and sleep. Examples include; yoga, deep breathing, listen to relaxing music, a hot bath or a few minutes of reading. Have a fixed bedtime and awakening time, Even on weekends! You will feel better keeping a regular sleep cycle, even if you are retired or not working. Get into your favorite sleep position. If not asleep in 30 minutes, get up and do something boring until you feel sleepy. Remember not to expose yourself to bright lights such as TV, phone or tablet screens. Only use your bed for sleeping. Do not use your bed as an office, workroom or recreation room. Use comfortable bedding. Uncomfortable bedding can prevent good sleep. Ensure your bedroom is quiet and comfortable. A cooler room along with enough blankets to stay warm is recommended. If your room is too noisy, try a white noise machine. If too bright, try black out shades or an eye mask. Dont take worries to bed. Leave worries about work, school etc. behind you when you go to bed. Some people find it helpful to assign a worry period in the evening or late afternoon to write down your worries and get them out of your system.

## 2022-08-30 LAB — FERRITIN: 36.1 NG/ML (ref 15–150)

## 2022-09-23 RX ORDER — PRAMIPEXOLE DIHYDROCHLORIDE 0.12 MG/1
0.12 TABLET ORAL NIGHTLY
Qty: 30 TABLET | Refills: 2 | OUTPATIENT
Start: 2022-09-23

## 2022-11-10 ENCOUNTER — TELEPHONE (OUTPATIENT)
Dept: PULMONOLOGY | Age: 47
End: 2022-11-10

## 2022-11-10 ENCOUNTER — TELEMEDICINE (OUTPATIENT)
Dept: SLEEP MEDICINE | Age: 47
End: 2022-11-10
Payer: COMMERCIAL

## 2022-11-10 DIAGNOSIS — R53.83 OTHER FATIGUE: ICD-10-CM

## 2022-11-10 DIAGNOSIS — G47.09 OTHER INSOMNIA: ICD-10-CM

## 2022-11-10 DIAGNOSIS — Z72.820 POOR SLEEP: ICD-10-CM

## 2022-11-10 DIAGNOSIS — G47.33 MILD OBSTRUCTIVE SLEEP APNEA: Primary | ICD-10-CM

## 2022-11-10 DIAGNOSIS — R79.0 LOW FERRITIN LEVEL: ICD-10-CM

## 2022-11-10 DIAGNOSIS — G25.81 RLS (RESTLESS LEGS SYNDROME): ICD-10-CM

## 2022-11-10 DIAGNOSIS — E66.9 OBESITY (BMI 30-39.9): ICD-10-CM

## 2022-11-10 PROCEDURE — 99214 OFFICE O/P EST MOD 30 MIN: CPT | Performed by: NURSE PRACTITIONER

## 2022-11-10 ASSESSMENT — SLEEP AND FATIGUE QUESTIONNAIRES
HOW LIKELY ARE YOU TO NOD OFF OR FALL ASLEEP IN A CAR, WHILE STOPPED FOR A FEW MINUTES IN TRAFFIC: 0
ESS TOTAL SCORE: 9
HOW LIKELY ARE YOU TO NOD OFF OR FALL ASLEEP WHILE SITTING QUIETLY AFTER LUNCH WITHOUT ALCOHOL: 1
HOW LIKELY ARE YOU TO NOD OFF OR FALL ASLEEP WHILE WATCHING TV: 1
HOW LIKELY ARE YOU TO NOD OFF OR FALL ASLEEP WHILE SITTING INACTIVE IN A PUBLIC PLACE: 0
HOW LIKELY ARE YOU TO NOD OFF OR FALL ASLEEP WHEN YOU ARE A PASSENGER IN A CAR FOR AN HOUR WITHOUT A BREAK: 3
HOW LIKELY ARE YOU TO NOD OFF OR FALL ASLEEP WHILE SITTING AND READING: 1
HOW LIKELY ARE YOU TO NOD OFF OR FALL ASLEEP WHILE LYING DOWN TO REST IN THE AFTERNOON WHEN CIRCUMSTANCES PERMIT: 3
HOW LIKELY ARE YOU TO NOD OFF OR FALL ASLEEP WHILE SITTING AND TALKING TO SOMEONE: 0

## 2022-11-10 NOTE — PROGRESS NOTES
Patient ID: Aida Malin is a 56 y.o. female who is being seen today for   Chief Complaint   Patient presents with    Sleep Apnea     2-3 month sleep/med     Referring: Dr. Jonna Hammond    HPI:     Aida Malin is a 56 y.o. female for televideo appointment via video and audio mychart virtual visit for RY/RLS follow up. Started Mirapex after last visit. States she took it for a couple of weeks, states no negative side effects but it didn't seem to help. States overall feels sleep/fatigue is a little better    States son came home from college this past weekend and felt she had the  best sleep ever. States work is busy now. States needs to get back to therapist    Ferritin level was low normal, I recommended patient's start iron supplement states she is talking iron inconsistently and trying to increase iron rich foods. Patient is using CPAP  8 hrs/night. Using humidifier. No snoring on CPAP. The pressure is well tolerated. The mask is comfortable- nasal mask. No mask leak. No significant daytime sleepiness. No nodding off when driving. No dry nose or throat. +fatigue. Bedtime is  pm and rise time is 6-630 am. Sleep onset is usually few-20 minutes. Wakes up 4-5 times at night total. No nocturia. It takes usually few minutes to fall back a sleep. No naps during the day. No headache in am. No weight gain. 1-2 caffienated beverages during the day. Occasional alcohol. ESS is 9           Previous HPI 8/29/22  Aida Malin is a 56 y.o. female in office for RY/RLS follow up. She is no longer taking requip states it made her feel nauseous. States fidgets, shifts, trying to get comfortable in the evenings and through the night. States has restlessness. States she did recently start seeing a therapist.      Patient is using CPAP 8 hrs/night. Using humidifier. No snoring on CPAP. The pressure is well tolerated. The mask is comfortable- nasal mask. No mask leak. +daytime sleepiness. Denies nodding off when driving. No dry nose or throat. +fatigue. Bedtime is 10- 1030 pm and rise time is 6-630 am. Sleep onset is 15 minutes. Wakes up 5-6 times at night total- feels uncomfortable/restless. No nocturia. It takes few-20 minutes to fall back a sleep. No naps during the day. No headache specifiaclly in am. No weight gain. 2 caffienated beverages during the day. Ocassional  alcohol. ESS is 9    No hallucinations when falling or waking from sleep  No cataplexy  No sleep paralysis        Previous HPI 5/26/22  Ish Suggs is a 56 y.o. female in office for RY/RLS/fatigue follow up. She started Requip after last visit. STates requip helps her fall asleep but she still feels some restlessness. Also still wakes through the night and does not feel rested in the mornings. She denies negative side effects of medication    Patient is using CPAP 8 hrs/night. Using humidifier. No snoring on CPAP. The pressure is well tolerated. The mask is comfortable nasal. No mask leak. +daytime sleepiness. Denies nodding off when driving. No dry nose or throat. +fatigue. Bedtime is  pm and rise time is 6-630 am. Sleep onset is few minutes. Wakes up 3-4 times at night total. No nocturia. It takes usually minutes to fall back a sleep. No naps during the day,k no time to nap. Occasional headache in am. No weight gain. 1-2 caffienated beverages during the day, done at lunch. Occasional alcohol. ESS is 13          Previous HPI 4/26/22  Ish Suggs is a 56 y.o. female for televideo appointment via video and audio doxy. me virtual visit for RY follow up. States pressure change  Patient is using CPAP 8-9 hrs/night. Using humidifier. No snoring on CPAP. The pressure is well tolerated. The mask is comfortable-nasal. No mask leak. +daytime sleepiness. States went to PCP and states did not really find other causes of fatigue. Denies nodding off when driving. No dry nose or throat. No fatigue.  Bedtime is  pm and rise time is 530-6 am. Sleep onset is usually few minutes. Wakes up 4-5 times at night total, feels restless. 0-1 nocturia. It takes usually few minutes to fall back a sleep. No naps during the day. No headache in am. No weight gain. 2 caffienated beverages during the day. Occassioanl alcohol. ESS is 10. Patient still having daytime sleepiness despite adequate treatment of sleep apnea. She does state has some restlessness at night and aching in legs. She denies cataplexy. She denies sleep paralysis. She denies hallucinations when falling or waking from sleep. States she has also discussed other causes of fatigue with PCP. Previous HPI 3/14/22  Ish Suggs is a 56 y.o. female in office for RY follow up. PSG and CPAP titration were reviewed by me and noted below. Results were dicussed with patient and multiple good questions were answered. PSG showed mild RY and patient started CPAP after testing. States she is doing okay with CPAP. States does feel a little better when waking, but is still fatigued. Patient is using CPAP  8-9 hrs/night. Using humidifier. No snoring on CPAP. The pressure is well tolerated. The mask is comfortable- nasal mask. No mask leak. +daytime sleepiness. Denies nodding off when driving. No dry nose or throat. +fatigue. Bedtime is  pm and rise time is 530-6 am. Sleep onset is few minutes. Wakes up 2-3 times at night total. No nocturia. It takes usually few minutes to fall back a sleep. Rare naps during the day. No headache in am. No weight gain. 1-2 caffienated beverages during the day. Occasional alcohol. ESS is 12      No hallucinations when falling or waking from sleep  No cataplexy  No sleep paralysis  Some RLS symptoms but better since starting CPAP. Initial HPI 11/15/21  Ish Suggs is a 56 y.o. female in office for snoring evaluation. States she is tired all of the time. Patient reports snoring at night for the past 10+ years, worse recently. Unsure if worse in supine position. Wakes self snoring. No known witnessed apnea. No restorative sleep. +dry mouth upon awakening. Fatigue and tiredness during the day. No history of sleep apnea-states has had a sleep study previously- mild maybe? States no treatment recommended at that time. Bedtime  pm and rise time is 530-7 am. It takes usually few minutes to fall asleep. 0-1 nocturia. Wakes up 6+ times at night. It takes few- unknown minutes to fall back a sleep. Takes occasional nap during the day. No headache in am. No car wrecks or near wrecks because of the sleepiness. No nodding off while driving but can get sleepy at times. Gained 60 pounds in the past 2-3 years. +forgetfulness and decreased concentration. No nasal congestion at night. Drinks 1-2 caffinated beverages per day. Occasional alcohol. +restless feelings in legs at night. No loss of muscle strength when angry or laugh. No hallucination when dozing off or waking up from sleep. No paralysis upon awakening from sleep or going to sleep.  +teeth grinding. No nightmares. No sleep walking. No night time panic attacks. No narcotics. No drug abuse. +history of depression, +history of anxiety states feels pretty well controlled. No history of atrial fibrillation. No history of DM. No history of HTN. No history of ischemic heart disease. No history of stroke. ESS is 14. No smoking. No known FH for RLS or narcolepsy.  +FH for RY- [facility]     Sleep Medicine 11/10/2022 8/29/2022 5/26/2022 4/26/2022 3/14/2022 11/15/2021   Sitting and reading 1 1 3 1 1 2   Watching TV 1 1 1 1 1 2   Sitting, inactive in a public place (e.g. a theatre or a meeting) 0 0 0 0 0 1   As a passenger in a car for an hour without a break 3 3 3 3 3 3   Lying down to rest in the afternoon when circumstances permit 3 3 3 3 3 3   Sitting and talking to someone 0 0 0 0 1 1   Sitting quietly after a lunch without alcohol 1 1 1 1 1 1   In a car, while stopped for a few minutes in traffic 0 0 2 1 2 1   [place] Sleepiness Score 9 9 13 10 12 14   Neck circumference (Inches) - 15.25 15.25 - 13 13.25       Past Medical History:  Past Medical History:   Diagnosis Date    Insomnia        Past Surgical History:        Procedure Laterality Date    ENDOMETRIAL BIOPSY      SHOULDER SURGERY Right 11/29/2016       Allergies:  is allergic to pcn [penicillins]. Social History:    TOBACCO:   reports that she has never smoked. She has never used smokeless tobacco.  ETOH:   reports no history of alcohol use. Family History:   No family history on file. Current Medications:    Current Outpatient Medications:     levonorgestrel (MIRENA, 52 MG,) IUD 52 mg, 1 Units by IntraUTERine route, Disp: , Rfl:     oxybutynin (DITROPAN-XL) 10 MG extended release tablet, , Disp: , Rfl:     cetirizine (ZYRTEC) 10 MG tablet, Take 10 mg by mouth daily, Disp: , Rfl:     FLUoxetine (PROZAC) 20 MG capsule, , Disp: , Rfl: 0    pramipexole (MIRAPEX) 0.125 MG tablet, Take 1 tablet by mouth nightly (Patient not taking: Reported on 11/10/2022), Disp: 30 tablet, Rfl: 2    Review of Systems        Objective:   PHYSICAL EXAM:  There were no vitals taken for this visit. Physical Exam  Exam:  Gen: No acute distress, does not appear to be in pain. Appears well developed and nourished. HENT: Head is normocephalic and atraumatic. Normal appearing nose. External Ears normal.   Neck: No visualized mass. Trachea is midline   Eyes: EOM intact. No visible discharge. Resp:No visualized signs of difficulty breathing or respiratory distress, speaking in full sentences. Respiratory effort normal.  Neuro: Awake. Alert. Able to follow commands. No facial asymmetry. Skin: No significant lesions or discoloration noted on facial skin    Musculoskeletal: Normal range of motion of the neck. Psych: Oriented x 3. No anxiety. Normal affect.         DATA:   12/6/2021 PSG AHI 9/REM AHI 38.3, low SPO2 88%  12/27/2021 titration improved RY with CPAP, treatment emergent CSA. Recommendations trial of auto CPAP 6-12 cm H2O  8/29/2022-ferritin level 36.1    CPAP download data:   Compliance download report from 2/9/22 to 3/10/22 reviewed today by me and showed patient is using machine 8:37 hrs/night with 97% compliance and AHI 1.6 within this time frame. 29/30days with greater than 4 hours of machine use. 90% pressure 9.2 cm H20 on auto CPAP 6-12 cm H2O     Compliance download report from 3/23/22 to 4/21/22 reviewed today by me and showed patient is using machine 8:27 hrs/night with 100% compliance and AHI 1.4 within this time frame. 30/30days with greater than 4 hours of machine use. 90% pressure 10.6 cm H20 on auto CPAP 6-12 cm H2O (report showing 6-12 cm H2O as current pressure however in [facility] pressure is showing as auto CPAP 9-13 cm H2O)      Compliance download report from 4/26/22 to 5/25/22 reviewed today by me and showed patient is using machine 7:57 hrs/night with 28% compliance and AHI 1.4 within this time frame. 28/30days with greater than 4 hours of machine use. 90% pressure 10.8 cm H20 on auto CPAP 6-12 cm H2O (report showing 6-12 cm H2O as current pressure however in [facility] pressure is showing as auto CPAP 9-13 cm H2O)    Compliance download report from 7/25/22 to 8/23/22 reviewed today by me and showed patient is using machine 8:42 hrs/night with 97% compliance and AHI 1.2 within this time frame. 29/30days with greater than 4 hours of machine use. 90% pressure 11.9 cm H20 on auto CPAP 11-15 cm H2O    Compliance download report from 10/10/22 to 11/8/22 reviewed today by me and showed patient is using machine 8:13 hrs/night with 77% compliance and AHI 1.6 within this time frame. 23/30days with greater than 4 hours of machine use. 90% pressure 12 cm H20 on auto CPAP 11-15 cm H2O    Assessment:       Mild RY. Auto CPAP 11-15 cm H2O.   Optimal compliance on review today  Snoring-resolved on CPAP  Hypersomnia -improving  Sleep maintenance insomnia  RLS  Obesity  Depression-followed by PCP/therapist        Plan:      Continue Auto CPAP 11-15 cm H2O   Discussed option to trial increase in Mirapex dose. Patient declines currently  Patient to follow-up with PCP for other possible causes of fatigue and tiredness  Continue to follow closely with PCP/therapist  Could consider titration with MSLT to follow if no improvement in sleepiness if no improvement with above-seems to have some improvement currently  Advised to use CPAP 6-8 hrs at night and during naps. Replacement of mask, tubing, head straps every 3-6 months or sooner if damaged. Patient instructed to contact [employer] for any mask, tubing or machine trouble shooting if problems arise. Sleep hygiene  D/W patient non pharmacological therapy for RLS including avoiding aggravating factors (sleep deprivation, mental alerting activities at time of rest, antihistamines), moderate regular exercise, leg message and heating pads/hot shower. Low normal ferritin level -iron deficiency has been considered a possible cause of RLS and giving iron replacement for ferritin <50-70 can be beneficial.    325 mg Ferrous sulfate BID, low ferritin levels have been associated with RLS, recommend treatment as level is less than 50-70  Repeat Ferritin in 1 month    Cognitive behavioral therapy was discussed with patient including stimulus control and sleep restriction   Avoid sedatives, alcohol and caffeinated drinks at bed time. No driving motorized vehicles or operating heavy machinery while fatigue, drowsy or sleepy-patient verbalized understanding and agrees. Weight loss is also recommended as a long-term intervention. Complications of RY if not treated were discussed with patient patient to include systemic hypertension, pulmonary hypertension, cardiovascular morbidities, car accidents and all cause mortality.   Discussed pathophysiology of RY with patient today  Patient education handout provided regarding sleep tips     Follow-up: 2-3 months, sooner if needed    Casi Alvarenga, was evaluated through a synchronous (real-time) audio-video encounter. The patient (or guardian if applicable) is aware that this is a billable service, which includes applicable co-pays. This Virtual Visit was conducted with patient's (and/or legal guardian's) consent. The visit was conducted pursuant to the emergency declaration under the [address] [place] [address], [address] [place] [address] authority and the [employer] and [employer] Act. Patient identification was verified, and a caregiver was present when appropriate. The patient was located at Home: [address] [place] [address]  [address] [facility]. Provider was located at [facility] (Appt Dept): [address],  [address]. Total time spent for this encounter: Not billed by time    --Josiephine Merlin, APRN - CNP on 11/10/2022 at 11:16 AM    An electronic signature was used to authenticate this note.

## 2022-11-10 NOTE — TELEPHONE ENCOUNTER
Patient needs 3 month follow up (ok for VV and 40 mins)     Patient called with message left for patient to call back to office.      Amado Avila also ordered a new Ferritin lab on patient to complete the end of Nov, Beginning of Dec.

## 2022-11-10 NOTE — PATIENT INSTRUCTIONS

## 2022-12-12 NOTE — TELEPHONE ENCOUNTER
Pt LM on VM asking for a return call.  Spoke with patient reminded her to have lab completed that [facility] ordered pt plans to complete this week

## 2022-12-14 ENCOUNTER — HOSPITAL ENCOUNTER (OUTPATIENT)
Age: 47
Discharge: HOME | End: 2022-12-14
Payer: COMMERCIAL

## 2022-12-14 DIAGNOSIS — R79.0 LOW FERRITIN LEVEL: ICD-10-CM

## 2022-12-14 DIAGNOSIS — R53.83 OTHER FATIGUE: ICD-10-CM

## 2022-12-14 DIAGNOSIS — G25.81 RLS (RESTLESS LEGS SYNDROME): ICD-10-CM

## 2022-12-14 LAB — FERRITIN: 84.5 NG/ML (ref 15–150)

## 2022-12-14 PROCEDURE — 36415 COLL VENOUS BLD VENIPUNCTURE: CPT

## 2022-12-14 PROCEDURE — 82728 ASSAY OF FERRITIN: CPT

## 2022-12-19 NOTE — TELEPHONE ENCOUNTER
Nita Pinto, APRN - CNP   12/15/2022  3:40 PM EST       Ferritin level improved and is good     Patient informed.

## 2023-01-10 ENCOUNTER — TELEPHONE (OUTPATIENT)
Dept: PULMONOLOGY | Age: 48
End: 2023-01-10

## 2023-01-10 NOTE — TELEPHONE ENCOUNTER
Appointment on 2/13/23 with Catina Mccormack for 3 month medication f/u. Reason: NP out of office; will need to r/s I offered multiple IN OFFICE appts, pt states she she only has availability on Tuesdays and thursdays pt does not want to go to [facility]. Okay to schedule pt for VV? Patient did not reschedule appointment. Appointment rescheduled for please advise on scheduling.

## 2023-01-24 ENCOUNTER — HOSPITAL ENCOUNTER (OUTPATIENT)
Age: 48
Discharge: HOME | End: 2023-01-24
Payer: COMMERCIAL

## 2023-01-24 LAB
EKG ATRIAL RATE: 56 BPM
EKG DIAGNOSIS: NORMAL
EKG P AXIS: 69 DEGREES
EKG P-R INTERVAL: 170 MS
EKG Q-T INTERVAL: 430 MS
EKG QRS DURATION: 82 MS
EKG QTC CALCULATION (BAZETT): 414 MS
EKG R AXIS: 36 DEGREES
EKG T AXIS: 34 DEGREES
EKG VENTRICULAR RATE: 56 BPM

## 2023-01-24 PROCEDURE — 93010 ELECTROCARDIOGRAM REPORT: CPT | Performed by: INTERNAL MEDICINE

## 2023-01-24 PROCEDURE — 93005 ELECTROCARDIOGRAM TRACING: CPT | Performed by: PHYSICIAN ASSISTANT

## 2023-01-26 ENCOUNTER — HOSPITAL ENCOUNTER (OUTPATIENT)
Dept: NON INVASIVE DIAGNOSTICS | Age: 48
Discharge: HOME | End: 2023-01-26
Payer: COMMERCIAL

## 2023-01-26 DIAGNOSIS — Z71.3 DIETARY COUNSELING: ICD-10-CM

## 2023-01-26 PROCEDURE — 93017 CV STRESS TEST TRACING ONLY: CPT

## 2023-01-26 NOTE — PROGRESS NOTES
Plain GXT stress test complete. Pt met THR, denied CP, complained of being winded, HA and fatigue on treadmill, all symptoms resolved with rest. Discharge instructions given to pt. Pt verbalizes understanding to discharge instructions.

## 2023-01-26 NOTE — DISCHARGE INSTRUCTIONS
Follow up with the ordering physician for the final stress test results.  Rest 2 hours after completion of test.   Resume diet.   Resume medications.  If you have chest pain or any concerns, contact your physician. If you are unable to contact your physician, go to the nearest emergency room or call 9-1-1.

## 2023-02-14 ENCOUNTER — TELEPHONE (OUTPATIENT)
Dept: PULMONOLOGY | Age: 48
End: 2023-02-14

## 2023-02-14 ENCOUNTER — TELEMEDICINE (OUTPATIENT)
Dept: SLEEP MEDICINE | Age: 48
End: 2023-02-14
Payer: COMMERCIAL

## 2023-02-14 DIAGNOSIS — G47.33 MILD OBSTRUCTIVE SLEEP APNEA: Primary | ICD-10-CM

## 2023-02-14 DIAGNOSIS — E66.9 OBESITY (BMI 30-39.9): ICD-10-CM

## 2023-02-14 DIAGNOSIS — R53.83 OTHER FATIGUE: ICD-10-CM

## 2023-02-14 DIAGNOSIS — G25.81 RLS (RESTLESS LEGS SYNDROME): ICD-10-CM

## 2023-02-14 DIAGNOSIS — Z71.89 CPAP USE COUNSELING: ICD-10-CM

## 2023-02-14 DIAGNOSIS — R79.0 LOW FERRITIN LEVEL: ICD-10-CM

## 2023-02-14 DIAGNOSIS — F51.04 PSYCHOPHYSIOLOGICAL INSOMNIA: ICD-10-CM

## 2023-02-14 PROCEDURE — 99214 OFFICE O/P EST MOD 30 MIN: CPT | Performed by: NURSE PRACTITIONER

## 2023-02-14 RX ORDER — VENLAFAXINE 75 MG/1
75 TABLET ORAL 3 TIMES DAILY
COMMUNITY

## 2023-02-14 ASSESSMENT — SLEEP AND FATIGUE QUESTIONNAIRES
HOW LIKELY ARE YOU TO NOD OFF OR FALL ASLEEP WHILE LYING DOWN TO REST IN THE AFTERNOON WHEN CIRCUMSTANCES PERMIT: 2
HOW LIKELY ARE YOU TO NOD OFF OR FALL ASLEEP WHEN YOU ARE A PASSENGER IN A CAR FOR AN HOUR WITHOUT A BREAK: 3
ESS TOTAL SCORE: 10
HOW LIKELY ARE YOU TO NOD OFF OR FALL ASLEEP WHILE SITTING AND READING: 1
HOW LIKELY ARE YOU TO NOD OFF OR FALL ASLEEP WHILE SITTING AND TALKING TO SOMEONE: 1
HOW LIKELY ARE YOU TO NOD OFF OR FALL ASLEEP IN A CAR, WHILE STOPPED FOR A FEW MINUTES IN TRAFFIC: 1
HOW LIKELY ARE YOU TO NOD OFF OR FALL ASLEEP WHILE SITTING INACTIVE IN A PUBLIC PLACE: 0
HOW LIKELY ARE YOU TO NOD OFF OR FALL ASLEEP WHILE WATCHING TV: 1
HOW LIKELY ARE YOU TO NOD OFF OR FALL ASLEEP WHILE SITTING QUIETLY AFTER LUNCH WITHOUT ALCOHOL: 1

## 2023-02-14 NOTE — PROGRESS NOTES
Patient ID: Deborah Nogueira is a 56 y.o. female who is being seen today for   Chief Complaint   Patient presents with    Sleep Apnea     3 month sleep      Referring: Dr. Sally Hedrick    HPI:   Deborah Nogueira is a 56 y.o. female for televideo appointment via video and audio virtual visit for RY follow up. States she is doing well with CPAP. States she is still tired. States morning are rough getting out of bed. States still feels restless, wakes through the night. States PCP did just change her antidepressant/anxiety medication. She plans to see gynecologist soon to discuss perimenopause symptoms. Patient is using CPAP 8 hrs/night. Using humidifier. No snoring on CPAP. The pressure is well tolerated. The mask is comfortable- nasal mask. No mask leak. Some daytime sleepiness. Denies nodding off when driving. No dry nose or throat.  +fatigue. Bedtime is  pm and rise time is 6-7 am. Sleep onset is usually <20 minutes. Wakes up 3-4 times at night total. No nocturia. It takes usually few minutes to fall back a sleep. No naps during the day. No headache in am. No weight gain. 2 caffienated beverages during the day. Rare alcohol. ESS is 10          Previous HPI 11/10/22  Deborah Nogueira is a 56 y.o. female for televideo appointment via video and audio mychart virtual visit for RY/RLS follow up. Started Mirapex after last visit. States she took it for a couple of weeks, states no negative side effects but it didn't seem to help. States overall feels sleep/fatigue is a little better    States son came home from college this past weekend and felt she had the  best sleep ever. States work is busy now. States needs to get back to therapist    Ferritin level was low normal, I recommended patient's start iron supplement states she is talking iron inconsistently and trying to increase iron rich foods. Patient is using CPAP  8 hrs/night. Using humidifier. No snoring on CPAP.  The pressure is well tolerated. The mask is comfortable- nasal mask. No mask leak. No significant daytime sleepiness. No nodding off when driving. No dry nose or throat. +fatigue. Bedtime is  pm and rise time is 6-630 am. Sleep onset is usually few-20 minutes. Wakes up 4-5 times at night total. No nocturia. It takes usually few minutes to fall back a sleep. No naps during the day. No headache in am. No weight gain. 1-2 caffienated beverages during the day. Occasional alcohol. ESS is 9           Previous HPI 8/29/22  Yudith Rodriguez is a 56 y.o. female in office for RY/RLS follow up. She is no longer taking requip states it made her feel nauseous. States fidgets, shifts, trying to get comfortable in the evenings and through the night. States has restlessness. States she did recently start seeing a therapist.      Patient is using CPAP 8 hrs/night. Using humidifier. No snoring on CPAP. The pressure is well tolerated. The mask is comfortable- nasal mask. No mask leak. +daytime sleepiness. Denies nodding off when driving. No dry nose or throat. +fatigue. Bedtime is 10- 1030 pm and rise time is 6-630 am. Sleep onset is 15 minutes. Wakes up 5-6 times at night total- feels uncomfortable/restless. No nocturia. It takes few-20 minutes to fall back a sleep. No naps during the day. No headache specifiaclly in am. No weight gain. 2 caffienated beverages during the day. Ocassional  alcohol. ESS is 9    No hallucinations when falling or waking from sleep  No cataplexy  No sleep paralysis        Previous HPI 5/26/22  Yudith Rodriguez is a 56 y.o. female in office for RY/RLS/fatigue follow up. She started Requip after last visit. STates requip helps her fall asleep but she still feels some restlessness. Also still wakes through the night and does not feel rested in the mornings. She denies negative side effects of medication    Patient is using CPAP 8 hrs/night. Using humidifier. No snoring on CPAP. The pressure is well tolerated. The mask is comfortable nasal. No mask leak. +daytime sleepiness. Denies nodding off when driving. No dry nose or throat. +fatigue. Bedtime is  pm and rise time is 6-630 am. Sleep onset is few minutes. Wakes up 3-4 times at night total. No nocturia. It takes usually minutes to fall back a sleep. No naps during the day,k no time to nap. Occasional headache in am. No weight gain. 1-2 caffienated beverages during the day, done at lunch. Occasional alcohol. ESS is 13          Previous HPI 4/26/22  Katharine Saucedo is a 56 y.o. female for televideo appointment via video and audio doxy. me virtual visit for RY follow up. States pressure change  Patient is using CPAP 8-9 hrs/night. Using humidifier. No snoring on CPAP. The pressure is well tolerated. The mask is comfortable-nasal. No mask leak. +daytime sleepiness. States went to PCP and states did not really find other causes of fatigue. Denies nodding off when driving. No dry nose or throat. No fatigue. Bedtime is  pm and rise time is 530-6 am. Sleep onset is usually few minutes. Wakes up 4-5 times at night total, feels restless. 0-1 nocturia. It takes usually few minutes to fall back a sleep. No naps during the day. No headache in am. No weight gain. 2 caffienated beverages during the day. Occassioanl alcohol. ESS is 10. Patient still having daytime sleepiness despite adequate treatment of sleep apnea. She does state has some restlessness at night and aching in legs. She denies cataplexy. She denies sleep paralysis. She denies hallucinations when falling or waking from sleep. States she has also discussed other causes of fatigue with PCP. Previous HPI 3/14/22  Katharine Saucedo is a 56 y.o. female in office for RY follow up. PSG and CPAP titration were reviewed by me and noted below. Results were dicussed with patient and multiple good questions were answered. PSG showed mild RY and patient started CPAP after testing.   States she is doing okay with CPAP. States does feel a little better when waking, but is still fatigued. Patient is using CPAP  8-9 hrs/night. Using humidifier. No snoring on CPAP. The pressure is well tolerated. The mask is comfortable- nasal mask. No mask leak. +daytime sleepiness. Denies nodding off when driving. No dry nose or throat. +fatigue. Bedtime is  pm and rise time is 530-6 am. Sleep onset is few minutes. Wakes up 2-3 times at night total. No nocturia. It takes usually few minutes to fall back a sleep. Rare naps during the day. No headache in am. No weight gain. 1-2 caffienated beverages during the day. Occasional alcohol. ESS is 12      No hallucinations when falling or waking from sleep  No cataplexy  No sleep paralysis  Some RLS symptoms but better since starting CPAP. Initial HPI 11/15/21  Alf Wick is a 56 y.o. female in office for snoring evaluation. States she is tired all of the time. Patient reports snoring at night for the past 10+ years, worse recently. Unsure if worse in supine position. Wakes self snoring. No known witnessed apnea. No restorative sleep. +dry mouth upon awakening. Fatigue and tiredness during the day. No history of sleep apnea-states has had a sleep study previously- mild maybe? States no treatment recommended at that time. Bedtime  pm and rise time is 530-7 am. It takes usually few minutes to fall asleep. 0-1 nocturia. Wakes up 6+ times at night. It takes few- unknown minutes to fall back a sleep. Takes occasional nap during the day. No headache in am. No car wrecks or near wrecks because of the sleepiness. No nodding off while driving but can get sleepy at times. Gained 60 pounds in the past 2-3 years. +forgetfulness and decreased concentration. No nasal congestion at night. Drinks 1-2 caffinated beverages per day. Occasional alcohol. +restless feelings in legs at night. No loss of muscle strength when angry or laugh.  No hallucination when dozing off or waking up from sleep. No paralysis upon awakening from sleep or going to sleep.  +teeth grinding. No nightmares. No sleep walking. No night time panic attacks. No narcotics. No drug abuse. +history of depression, +history of anxiety states feels pretty well controlled. No history of atrial fibrillation. No history of DM. No history of HTN. No history of ischemic heart disease. No history of stroke. ESS is 14. No smoking. No known FH for RLS or narcolepsy. +FH for RY- mom     Sleep Medicine 2/14/2023 11/10/2022 8/29/2022 5/26/2022 4/26/2022 3/14/2022 11/15/2021   Sitting and reading 1 1 1 3 1 1 2   Watching TV 1 1 1 1 1 1 2   Sitting, inactive in a public place (e.g. a theatre or a meeting) 0 0 0 0 0 0 1   As a passenger in a car for an hour without a break 3 3 3 3 3 3 3   Lying down to rest in the afternoon when circumstances permit 2 3 3 3 3 3 3   Sitting and talking to someone 1 0 0 0 0 1 1   Sitting quietly after a lunch without alcohol 1 1 1 1 1 1 1   In a car, while stopped for a few minutes in traffic 1 0 0 2 1 2 1   Igo Sleepiness Score 10 9 9 13 10 12 14   Neck circumference (Inches) - - 15.25 15.25 - 13 13.25       Past Medical History:  Past Medical History:   Diagnosis Date    Insomnia        Past Surgical History:        Procedure Laterality Date    ENDOMETRIAL BIOPSY      SHOULDER SURGERY Right 11/29/2016       Allergies:  is allergic to pcn [penicillins]. Social History:    TOBACCO:   reports that she has never smoked. She has never used smokeless tobacco.  ETOH:   reports no history of alcohol use. Family History:   No family history on file.     Current Medications:    Current Outpatient Medications:     venlafaxine (EFFEXOR) 75 MG tablet, Take 75 mg by mouth 3 times daily, Disp: , Rfl:     Ferrous Sulfate (IRON PO), Take by mouth, Disp: , Rfl:     levonorgestrel (MIRENA, 52 MG,) IUD 52 mg, 1 Units by IntraUTERine route, Disp: , Rfl:     oxybutynin (DITROPAN-XL) 10 MG extended release tablet, , Disp: , Rfl:     cetirizine (ZYRTEC) 10 MG tablet, Take 10 mg by mouth daily, Disp: , Rfl:     FLUoxetine (PROZAC) 20 MG capsule, , Disp: , Rfl: 0    Review of Systems        Objective:   PHYSICAL EXAM:  There were no vitals taken for this visit. Physical Exam  Exam:  Gen: No acute distress, does not appear to be in pain. Appears well developed and nourished. HENT: Head is normocephalic and atraumatic. Normal appearing nose. External Ears normal.   Neck: No visualized mass. Trachea is midline   Eyes: EOM intact. No visible discharge. Resp:No visualized signs of difficulty breathing or respiratory distress, speaking in full sentences. Respiratory effort normal.  Neuro: Awake. Alert. Able to follow commands. No facial asymmetry. Skin: No significant lesions or discoloration noted on facial skin    Musculoskeletal: Normal range of motion of the neck. Psych: Oriented x 3. No anxiety. Normal affect. DATA:   12/6/2021 PSG AHI 9/REM AHI 38.3, low SPO2 88%  12/27/2021 titration improved RY with CPAP, treatment emergent CSA. Recommendations trial of auto CPAP 6-12 cm H2O  8/29/2022-ferritin level 36.1  12/14/2022 ferritin 84.5    CPAP download data:   Compliance download report from 2/9/22 to 3/10/22 reviewed today by me and showed patient is using machine 8:37 hrs/night with 97% compliance and AHI 1.6 within this time frame. 29/30days with greater than 4 hours of machine use. 90% pressure 9.2 cm H20 on auto CPAP 6-12 cm H2O     Compliance download report from 3/23/22 to 4/21/22 reviewed today by me and showed patient is using machine 8:27 hrs/night with 100% compliance and AHI 1.4 within this time frame. 30/30days with greater than 4 hours of machine use.   90% pressure 10.6 cm H20 on auto CPAP 6-12 cm H2O (report showing 6-12 cm H2O as current pressure however in [facility] pressure is showing as auto CPAP 9-13 cm H2O)      Compliance download report from 4/26/22 to 5/25/22 reviewed today by me and showed patient is using machine 7:57 hrs/night with 28% compliance and AHI 1.4 within this time frame. 28/30days with greater than 4 hours of machine use. 90% pressure 10.8 cm H20 on auto CPAP 6-12 cm H2O (report showing 6-12 cm H2O as current pressure however in [facility] pressure is showing as auto CPAP 9-13 cm H2O)    Compliance download report from 7/25/22 to 8/23/22 reviewed today by me and showed patient is using machine 8:42 hrs/night with 97% compliance and AHI 1.2 within this time frame. 29/30days with greater than 4 hours of machine use. 90% pressure 11.9 cm H20 on auto CPAP 11-15 cm H2O    Compliance download report from 10/10/22 to 11/8/22 reviewed today by me and showed patient is using machine 8:13 hrs/night with 77% compliance and AHI 1.6 within this time frame. 23/30days with greater than 4 hours of machine use. 90% pressure 12 cm H20 on auto CPAP 11-15 cm H2O    Compliance download report from 1/14/23 to 2/12/23 reviewed today by me and showed patient is using machine 8:46 hrs/night with 100% compliance and AHI 1 within this time frame. 30/30days with greater than 4 hours of machine use. 90% pressure 12.5 cm H20 on auto CPAP 11-15 cm H2O     Compliance download report from 1/14/23 to 2/12/23 reviewed today by me and showed patient is using machine 8:46 hrs/night with 100% compliance and AHI 1 within this time frame. 30/30days with greater than 4 hours of machine use. 90% pressure 12.5 cm H20 on auto CPAP 11-15 cm H2O     Assessment:       Mild RY. Auto CPAP 11-15 cm H2O. Optimal compliance on review today  Snoring-resolved on CPAP  Hypersomnia   Sleep maintenance insomnia  RLS  Obesity  Depression-followed by PCP/therapist        Plan:      Change to Auto CPAP 12-16 cm H2O   Discussed option to trial increase in Mirapex dose.   Patient declines currently  Patient to follow-up with PCP for other possible causes of fatigue and tiredness  Continue to follow closely with PCP/therapist-antidepressant recently changed by PCP  Could consider titration with MSLT to follow if no improvement in sleepiness if no improvement with above-  Advised to use CPAP 6-8 hrs at night and during naps. Replacement of mask, tubing, head straps every 3-6 months or sooner if damaged. Patient instructed to contact [employer] for any mask, tubing or machine trouble shooting if problems arise. Sleep hygiene  D/W patient non pharmacological therapy for RLS including avoiding aggravating factors (sleep deprivation, mental alerting activities at time of rest, antihistamines), moderate regular exercise, leg message and heating pads/hot shower. Low normal ferritin level -iron deficiency has been considered a possible cause of RLS and giving iron replacement for ferritin <50-70 can be beneficial.    325 mg Ferrous sulfate BID, low ferritin levels have been associated with RLS, recommend treatment as level is less than 50-70 (stop iron if above 70-discussed with patient)  Repeat Ferritin   Cognitive behavioral therapy was discussed with patient including stimulus control and sleep restriction   Avoid sedatives, alcohol and caffeinated drinks at bed time. No driving motorized vehicles or operating heavy machinery while fatigue, drowsy or sleepy-patient verbalized understanding and agrees. Weight loss is also recommended as a long-term intervention. Complications of RY if not treated were discussed with patient patient to include systemic hypertension, pulmonary hypertension, cardiovascular morbidities, car accidents and all cause mortality. Discussed pathophysiology of RY with patient today  Patient education handout provided regarding sleep tips     Follow-up: 6 months (pt preference), sooner if needed    Melanie Oakes, was evaluated through a synchronous (real-time) audio-video encounter.  The patient (or guardian if applicable) is aware that this is a billable service, which includes applicable co-pays. This Virtual Visit was conducted with patient's (and/or legal guardian's) consent. The visit was conducted pursuant to the emergency declaration under the [address] [place] [address] authority and the [employer] General Act. Patient identification was verified, and a caregiver was present when appropriate. The patient was located at Home: [address] [place] [address]  Provider was located at Home ([address]: [place]         Total time spent for this encounter: Not billed by time    --VICTORINO Boucher CNP on 2/14/2023 at 11:56 AM    An electronic signature was used to authenticate this note.

## 2023-03-02 ENCOUNTER — HOSPITAL ENCOUNTER (OUTPATIENT)
Age: 48
Discharge: HOME | End: 2023-03-02
Payer: COMMERCIAL

## 2023-03-02 DIAGNOSIS — Z71.89 CPAP USE COUNSELING: ICD-10-CM

## 2023-03-02 DIAGNOSIS — R79.0 LOW FERRITIN LEVEL: ICD-10-CM

## 2023-03-02 DIAGNOSIS — F51.04 PSYCHOPHYSIOLOGICAL INSOMNIA: ICD-10-CM

## 2023-03-02 DIAGNOSIS — G47.33 MILD OBSTRUCTIVE SLEEP APNEA: ICD-10-CM

## 2023-03-02 DIAGNOSIS — R53.83 OTHER FATIGUE: ICD-10-CM

## 2023-03-02 DIAGNOSIS — G25.81 RLS (RESTLESS LEGS SYNDROME): ICD-10-CM

## 2023-03-02 DIAGNOSIS — E66.9 OBESITY (BMI 30-39.9): ICD-10-CM

## 2023-03-02 PROCEDURE — 36415 COLL VENOUS BLD VENIPUNCTURE: CPT

## 2023-03-02 PROCEDURE — 82728 ASSAY OF FERRITIN: CPT

## 2023-03-03 LAB — FERRITIN: 68.6 NG/ML (ref 15–150)

## 2023-03-06 ENCOUNTER — TELEPHONE (OUTPATIENT)
Dept: PULMONOLOGY | Age: 48
End: 2023-03-06

## 2023-03-06 DIAGNOSIS — G25.81 RLS (RESTLESS LEGS SYNDROME): Primary | ICD-10-CM

## 2023-03-06 DIAGNOSIS — R79.0 LOW FERRITIN: ICD-10-CM

## 2023-03-16 NOTE — TELEPHONE ENCOUNTER
CPAP download report from 2/14/2023 - 3/15/2023 on auto CPAP 12-16 cm H2O reviewed. Compliance is good 97%.   AHI is good 1.2

## 2023-06-19 ENCOUNTER — INITIAL CONSULT (OUTPATIENT)
Dept: SURGERY | Age: 48
End: 2023-06-19
Payer: COMMERCIAL

## 2023-06-19 VITALS
WEIGHT: 229.3 LBS | HEART RATE: 73 BPM | DIASTOLIC BLOOD PRESSURE: 66 MMHG | BODY MASS INDEX: 35.99 KG/M2 | SYSTOLIC BLOOD PRESSURE: 114 MMHG | HEIGHT: 67 IN

## 2023-06-19 DIAGNOSIS — D17.23 LIPOMA OF RIGHT LOWER EXTREMITY: Primary | ICD-10-CM

## 2023-06-19 PROCEDURE — 99202 OFFICE O/P NEW SF 15 MIN: CPT | Performed by: SURGERY

## 2023-06-19 RX ORDER — FLUTICASONE PROPIONATE 50 MCG
1 SPRAY, SUSPENSION (ML) NASAL DAILY
COMMUNITY

## 2023-06-19 NOTE — PROGRESS NOTES
Partners: Male   Other Topics Concern    Not on file   Social History Narrative    Not on file     Social Determinants of Health     Financial Resource Strain: Not on file   Food Insecurity: Not on file   Transportation Needs: Not on file   Physical Activity: Not on file   Stress: Not on file   Social Connections: Not on file   Intimate Partner Violence: Not on file   Housing Stability: Not on file          Vitals:    06/19/23 1500   BP: 114/66   Site: Right Wrist   Position: Sitting   Cuff Size: Large Adult   Pulse: 73   Weight: 229 lb 4.8 oz (104 kg)   Height: 5' 7\" (1.702 m)     Body mass index is 35.91 kg/m². Wt Readings from Last 3 Encounters:   06/19/23 229 lb 4.8 oz (104 kg)   08/29/22 224 lb (101.6 kg)   05/26/22 213 lb 6.4 oz (96.8 kg)     BP Readings from Last 3 Encounters:   06/19/23 114/66   08/29/22 120/74   05/26/22 97/63        REVIEW OF SYSTEMS:  CONSTITUTIONAL:  negative  HEENT:  Negative  RESPIRATORY:  negative  CARDIOVASCULAR:  negative  GASTROINTESTINAL:  negative  GENITOURINARY:  negative  HEMATOLOGIC/LYMPHATIC:  negative  ENDOCRINE:  Negative  NEUROLOGICAL:  Negative  * All other ROS reviewed and negative. PE:  Constitutional:  Well developed, well nourished, no acute distress, non-toxic appearance   Eyes:  PERRL, conjunctiva normal   HENT:  Atraumatic, external ears normal, nose normal. Neck- normal range of motion, no tenderness, supple   Respiratory:  No respiratory distress, normal breath sounds, no rales, no wheezing   Cardiovascular:  Normal rate, normal rhythm  Integument: Right posteromedial thigh with about a 4 cm soft mobile subcutaneous mass consistent with lipoma  Lymphatic:  No lymphadenopathy noted   Neurologic:  Alert & oriented x 3, no focal deficits noted   Psychiatric:  Speech and behavior appropriate       DATA:  N/A      Assessment:  1. Lipoma of right lower extremity        Plan: Excision of lipoma.  I explained the procedure including risks, benefits, and

## 2023-07-06 ENCOUNTER — TELEPHONE (OUTPATIENT)
Dept: SURGERY | Age: 48
End: 2023-07-06

## 2023-07-16 ENCOUNTER — ANESTHESIA EVENT (OUTPATIENT)
Dept: OPERATING ROOM | Age: 48
End: 2023-07-16
Payer: COMMERCIAL

## 2023-07-18 ENCOUNTER — HOSPITAL ENCOUNTER (OUTPATIENT)
Age: 48
Setting detail: OUTPATIENT SURGERY
Discharge: HOME | End: 2023-07-18
Attending: SURGERY | Admitting: SURGERY
Payer: COMMERCIAL

## 2023-07-18 ENCOUNTER — ANESTHESIA (OUTPATIENT)
Dept: OPERATING ROOM | Age: 48
End: 2023-07-18
Payer: COMMERCIAL

## 2023-07-18 VITALS
TEMPERATURE: 97.4 F | HEART RATE: 55 BPM | OXYGEN SATURATION: 98 % | WEIGHT: 230 LBS | HEIGHT: 67 IN | RESPIRATION RATE: 18 BRPM | DIASTOLIC BLOOD PRESSURE: 62 MMHG | SYSTOLIC BLOOD PRESSURE: 117 MMHG | BODY MASS INDEX: 36.1 KG/M2

## 2023-07-18 DIAGNOSIS — D17.23 LIPOMA OF RIGHT THIGH: ICD-10-CM

## 2023-07-18 LAB
DEPRECATED RDW RBC AUTO: 13.1 % (ref 12.4–15.4)
HCG UR QL: NEGATIVE
HCT VFR BLD AUTO: 41.9 % (ref 36–48)
HGB BLD-MCNC: 14.5 G/DL (ref 12–16)
MCH RBC QN AUTO: 31 PG (ref 26–34)
MCHC RBC AUTO-ENTMCNC: 34.6 G/DL (ref 31–36)
MCV RBC AUTO: 89.5 FL (ref 80–100)
PLATELET # BLD AUTO: 234 K/UL (ref 135–450)
PMV BLD AUTO: 8.4 FL (ref 5–10.5)
RBC # BLD AUTO: 4.68 M/UL (ref 4–5.2)
WBC # BLD AUTO: 5.7 K/UL (ref 4–11)

## 2023-07-18 PROCEDURE — 2500000003 HC RX 250 WO HCPCS: Performed by: ANESTHESIOLOGY

## 2023-07-18 PROCEDURE — 84703 CHORIONIC GONADOTROPIN ASSAY: CPT

## 2023-07-18 PROCEDURE — 6370000000 HC RX 637 (ALT 250 FOR IP): Performed by: ANESTHESIOLOGY

## 2023-07-18 PROCEDURE — 27337 EXC THIGH/KNEE LES SC 3 CM/>: CPT | Performed by: SURGERY

## 2023-07-18 PROCEDURE — 3700000000 HC ANESTHESIA ATTENDED CARE: Performed by: SURGERY

## 2023-07-18 PROCEDURE — 3700000001 HC ADD 15 MINUTES (ANESTHESIA): Performed by: SURGERY

## 2023-07-18 PROCEDURE — 7100000010 HC PHASE II RECOVERY - FIRST 15 MIN: Performed by: SURGERY

## 2023-07-18 PROCEDURE — 2580000003 HC RX 258: Performed by: NURSE ANESTHETIST, CERTIFIED REGISTERED

## 2023-07-18 PROCEDURE — 3600000012 HC SURGERY LEVEL 2 ADDTL 15MIN: Performed by: SURGERY

## 2023-07-18 PROCEDURE — 88304 TISSUE EXAM BY PATHOLOGIST: CPT

## 2023-07-18 PROCEDURE — A4217 STERILE WATER/SALINE, 500 ML: HCPCS | Performed by: SURGERY

## 2023-07-18 PROCEDURE — 3600000002 HC SURGERY LEVEL 2 BASE: Performed by: SURGERY

## 2023-07-18 PROCEDURE — 6360000002 HC RX W HCPCS: Performed by: SURGERY

## 2023-07-18 PROCEDURE — 6360000002 HC RX W HCPCS: Performed by: NURSE ANESTHETIST, CERTIFIED REGISTERED

## 2023-07-18 PROCEDURE — 2709999900 HC NON-CHARGEABLE SUPPLY: Performed by: SURGERY

## 2023-07-18 PROCEDURE — 2580000003 HC RX 258: Performed by: ANESTHESIOLOGY

## 2023-07-18 PROCEDURE — 2580000003 HC RX 258: Performed by: SURGERY

## 2023-07-18 PROCEDURE — 7100000011 HC PHASE II RECOVERY - ADDTL 15 MIN: Performed by: SURGERY

## 2023-07-18 PROCEDURE — 36415 COLL VENOUS BLD VENIPUNCTURE: CPT

## 2023-07-18 PROCEDURE — 85027 COMPLETE CBC AUTOMATED: CPT

## 2023-07-18 PROCEDURE — 2500000003 HC RX 250 WO HCPCS: Performed by: SURGERY

## 2023-07-18 RX ORDER — FENTANYL CITRATE 50 UG/ML
INJECTION, SOLUTION INTRAMUSCULAR; INTRAVENOUS PRN
Status: DISCONTINUED | OUTPATIENT
Start: 2023-07-18 | End: 2023-07-18 | Stop reason: SDUPTHER

## 2023-07-18 RX ORDER — DIPHENHYDRAMINE HYDROCHLORIDE 50 MG/ML
12.5 INJECTION INTRAMUSCULAR; INTRAVENOUS
Status: DISCONTINUED | OUTPATIENT
Start: 2023-07-18 | End: 2023-07-18 | Stop reason: HOSPADM

## 2023-07-18 RX ORDER — PROPOFOL 10 MG/ML
INJECTION, EMULSION INTRAVENOUS PRN
Status: DISCONTINUED | OUTPATIENT
Start: 2023-07-18 | End: 2023-07-18 | Stop reason: SDUPTHER

## 2023-07-18 RX ORDER — MEPERIDINE HYDROCHLORIDE 25 MG/ML
12.5 INJECTION INTRAMUSCULAR; INTRAVENOUS; SUBCUTANEOUS EVERY 5 MIN PRN
Status: DISCONTINUED | OUTPATIENT
Start: 2023-07-18 | End: 2023-07-18 | Stop reason: HOSPADM

## 2023-07-18 RX ORDER — HYDRALAZINE HYDROCHLORIDE 20 MG/ML
5 INJECTION INTRAMUSCULAR; INTRAVENOUS
Status: DISCONTINUED | OUTPATIENT
Start: 2023-07-18 | End: 2023-07-18 | Stop reason: HOSPADM

## 2023-07-18 RX ORDER — SODIUM CHLORIDE 0.9 % (FLUSH) 0.9 %
5-40 SYRINGE (ML) INJECTION PRN
Status: DISCONTINUED | OUTPATIENT
Start: 2023-07-18 | End: 2023-07-18 | Stop reason: HOSPADM

## 2023-07-18 RX ORDER — OXYCODONE HYDROCHLORIDE AND ACETAMINOPHEN 5; 325 MG/1; MG/1
1 TABLET ORAL EVERY 6 HOURS PRN
Qty: 20 TABLET | Refills: 0 | Status: SHIPPED | OUTPATIENT
Start: 2023-07-18 | End: 2023-07-23

## 2023-07-18 RX ORDER — MIDAZOLAM HYDROCHLORIDE 1 MG/ML
1 INJECTION INTRAMUSCULAR; INTRAVENOUS EVERY 5 MIN PRN
Status: DISCONTINUED | OUTPATIENT
Start: 2023-07-18 | End: 2023-07-18 | Stop reason: HOSPADM

## 2023-07-18 RX ORDER — SODIUM CHLORIDE, SODIUM LACTATE, POTASSIUM CHLORIDE, CALCIUM CHLORIDE 600; 310; 30; 20 MG/100ML; MG/100ML; MG/100ML; MG/100ML
INJECTION, SOLUTION INTRAVENOUS CONTINUOUS
Status: DISCONTINUED | OUTPATIENT
Start: 2023-07-18 | End: 2023-07-18 | Stop reason: HOSPADM

## 2023-07-18 RX ORDER — SODIUM CHLORIDE 9 MG/ML
INJECTION, SOLUTION INTRAVENOUS PRN
Status: DISCONTINUED | OUTPATIENT
Start: 2023-07-18 | End: 2023-07-18 | Stop reason: HOSPADM

## 2023-07-18 RX ORDER — FAMOTIDINE 10 MG/ML
20 INJECTION, SOLUTION INTRAVENOUS ONCE
Status: COMPLETED | OUTPATIENT
Start: 2023-07-18 | End: 2023-07-18

## 2023-07-18 RX ORDER — PROPOFOL 10 MG/ML
INJECTION, EMULSION INTRAVENOUS CONTINUOUS PRN
Status: DISCONTINUED | OUTPATIENT
Start: 2023-07-18 | End: 2023-07-18 | Stop reason: SDUPTHER

## 2023-07-18 RX ORDER — OXYCODONE HYDROCHLORIDE 5 MG/1
5 TABLET ORAL
Status: COMPLETED | OUTPATIENT
Start: 2023-07-18 | End: 2023-07-18

## 2023-07-18 RX ORDER — SODIUM CHLORIDE 0.9 % (FLUSH) 0.9 %
5-40 SYRINGE (ML) INJECTION EVERY 12 HOURS SCHEDULED
Status: DISCONTINUED | OUTPATIENT
Start: 2023-07-18 | End: 2023-07-18 | Stop reason: HOSPADM

## 2023-07-18 RX ORDER — ONDANSETRON 2 MG/ML
4 INJECTION INTRAMUSCULAR; INTRAVENOUS EVERY 10 MIN PRN
Status: DISCONTINUED | OUTPATIENT
Start: 2023-07-18 | End: 2023-07-18 | Stop reason: HOSPADM

## 2023-07-18 RX ORDER — MAGNESIUM HYDROXIDE 1200 MG/15ML
LIQUID ORAL CONTINUOUS PRN
Status: COMPLETED | OUTPATIENT
Start: 2023-07-18 | End: 2023-07-18

## 2023-07-18 RX ORDER — SODIUM CHLORIDE, SODIUM LACTATE, POTASSIUM CHLORIDE, CALCIUM CHLORIDE 600; 310; 30; 20 MG/100ML; MG/100ML; MG/100ML; MG/100ML
INJECTION, SOLUTION INTRAVENOUS CONTINUOUS PRN
Status: DISCONTINUED | OUTPATIENT
Start: 2023-07-18 | End: 2023-07-18 | Stop reason: SDUPTHER

## 2023-07-18 RX ADMIN — FAMOTIDINE 20 MG: 10 INJECTION, SOLUTION INTRAVENOUS at 12:28

## 2023-07-18 RX ADMIN — PROPOFOL 125 MCG/KG/MIN: 10 INJECTION, EMULSION INTRAVENOUS at 13:52

## 2023-07-18 RX ADMIN — FENTANYL CITRATE 50 MCG: 50 INJECTION INTRAMUSCULAR; INTRAVENOUS at 14:08

## 2023-07-18 RX ADMIN — FENTANYL CITRATE 50 MCG: 50 INJECTION INTRAMUSCULAR; INTRAVENOUS at 13:52

## 2023-07-18 RX ADMIN — SODIUM CHLORIDE, POTASSIUM CHLORIDE, SODIUM LACTATE AND CALCIUM CHLORIDE: 600; 310; 30; 20 INJECTION, SOLUTION INTRAVENOUS at 12:28

## 2023-07-18 RX ADMIN — PROPOFOL 100 MG: 10 INJECTION, EMULSION INTRAVENOUS at 13:52

## 2023-07-18 RX ADMIN — OXYCODONE HYDROCHLORIDE 5 MG: 5 TABLET ORAL at 14:55

## 2023-07-18 RX ADMIN — SODIUM CHLORIDE, POTASSIUM CHLORIDE, SODIUM LACTATE AND CALCIUM CHLORIDE: 600; 310; 30; 20 INJECTION, SOLUTION INTRAVENOUS at 13:49

## 2023-07-18 ASSESSMENT — PAIN - FUNCTIONAL ASSESSMENT
PAIN_FUNCTIONAL_ASSESSMENT: ACTIVITIES ARE NOT PREVENTED
PAIN_FUNCTIONAL_ASSESSMENT: 0-10
PAIN_FUNCTIONAL_ASSESSMENT: ACTIVITIES ARE NOT PREVENTED

## 2023-07-18 ASSESSMENT — PAIN SCALES - GENERAL: PAINLEVEL_OUTOF10: 2

## 2023-07-18 ASSESSMENT — PAIN DESCRIPTION - DESCRIPTORS: DESCRIPTORS: ACHING;DISCOMFORT

## 2023-07-18 ASSESSMENT — PAIN DESCRIPTION - ORIENTATION: ORIENTATION: UPPER

## 2023-07-18 ASSESSMENT — PAIN DESCRIPTION - LOCATION: LOCATION: LEG

## 2023-07-18 NOTE — BRIEF OP NOTE
Brief Postoperative Note      Patient: Terell Ashby  YOB: 1975  MRN: [id number]    Date of Procedure: 7/18/2023    Pre-Op Diagnosis Codes:     * Lipoma of right thigh [D17.23]    Post-Op Diagnosis: Same       Procedure(s):  EXCISION OF RIGHT THIGH LIPOMA    Surgeon(s):  Tiffany Valera MD    Assistant:  Surgical Assistant: Elayne Andrade    Anesthesia: Monitor Anesthesia Care    Estimated Blood Loss (mL): less than 50     Complications: None    Specimens:   ID Type Source Tests Collected by Time Destination   A : a. right thigh lipoma Tissue Tissue SURGICAL PATHOLOGY Tiffany Valera MD 7/18/2023 1410        Implants:  * No implants in log *      Drains: * No LDAs found *    Findings: as above        Electronically signed by Tiffany Valera MD on 7/18/2023 at 2:26 PM

## 2023-07-18 NOTE — PROGRESS NOTES
IV x 1 removed per discharge hemostasis achieved, dressing applied, no complications noted. Patient denies further needs. Pt transported to private car via wheel chair. Vitals per doc flow, pt  Michael Wells assumes responsibility.

## 2023-07-18 NOTE — ANESTHESIA POSTPROCEDURE EVALUATION
Department of Anesthesiology  Postprocedure Note    Patient: Genet Frye  MRN: [id number]  YOB: 1975  Date of evaluation: 7/18/2023      Procedure Summary     Date: 07/18/23 Room / Location: [address] [place] [address] / [facility]    Anesthesia Start: 7410 Anesthesia Stop: 8616    Procedure: EXCISION OF RIGHT THIGH LIPOMA (Right: Leg Lower) Diagnosis:       Lipoma of right thigh      (Lipoma of right thigh [D17.23])    Surgeons: Víctor Molina MD Responsible Provider: Joyce Hargrove MD    Anesthesia Type: MAC ASA Status: 2          Anesthesia Type: No value filed.     Trudi Phase I: Trudi Score: 10    Trudi Phase II: Trudi Score: 10      Anesthesia Post Evaluation    Patient location during evaluation: PACU  Level of consciousness: awake  Airway patency: patent  Nausea & Vomiting: no nausea  Complications: no  Cardiovascular status: blood pressure returned to baseline  Respiratory status: acceptable  Hydration status: euvolemic

## 2023-07-18 NOTE — H&P
I have reviewed the progress note serving as history and physical dated June/19/2023 and examined the patient and find no relevant changes. I have reviewed with the patient and/or family the risks, benefits, and alternatives to the procedure.

## 2023-07-18 NOTE — OP NOTE
[facility] [address], [address]                                OPERATIVE REPORT    PATIENT NAME: Genet Frye                     :        1975  MED REC NO:   [id number]                          ROOM:  ACCOUNT NO:   [de-identified]                           ADMIT DATE: 2023  PROVIDER:     Mirza Meza MD    DATE OF PROCEDURE:  2023    PREOPERATIVE DIAGNOSIS:  Right thigh lipoma. POSTOPERATIVE DIAGNOSIS:  Right thigh lipoma. PROCEDURE:  Excision of right thigh lipoma. ANESTHESIA:  Local with MAC. SURGEON:  Mirza Meza MD    ESTIMATED BLOOD LOSS:  Minimal.    INDICATIONS:  The patient is a 50-year-old woman with enlarging right  thigh mass. She is brought in for excision. OPERATIVE SUMMARY:  After preoperative evaluation, the patient was  brought in the operating suite and placed in a comfortable supine  position on the operating table. Monitoring equipment was attached and  she was given intravenous sedation per Anesthesia. She was placed in  lithotomy position and her right posterior thigh was sterilely prepped  and anesthetized with local anesthetic. Incision was made over the mass  and dissected down through the subcutaneous tissues. She had numerous  small extensions into the subcutaneous space. These were all excised  and the mass measured 5 x 7 cm. Bleeding was controlled with  electrocautery and incision was closed with interrupted subcutaneous  sutures of 3-0 Vicryl and running subcuticular suture of 4-0 Vicryl. Benzoin, Steri-Strips, and dry sterile dressings were applied. All  sponge, needle, and instrument counts were correct at the end of case. The patient tolerated the procedure well. She was taken to recovery  area in stable condition. Mekhi Chandler MD    D: 2023 14:54:26       T: 2023 14:59:48     [email or web address]  Job#: [id number]     Doc#:

## 2023-07-18 NOTE — PROGRESS NOTES
Discharge instructions explained in detail at bedside to both pt and pt  Shalinilious Kussmaul. Pt and  received copy of discharge instructions and made aware to  prescriptions x 1 at [facility] pharmacy in [place]. . Pt  and  deny having any questions about discharge.

## 2023-07-18 NOTE — PROGRESS NOTES
Pt arrived to PACU from OR in stable condition. Report received from Iman NOVOA and SHAHNAZ. Phase II. Care of pt transferred at this time. Pt placed on cont pulse ox and BP. Pt arrived to unit without any oxygen requirements. SPO2 98% on RA.

## 2023-07-21 ENCOUNTER — TELEPHONE (OUTPATIENT)
Dept: SURGERY | Age: 48
End: 2023-07-21

## 2023-07-21 NOTE — TELEPHONE ENCOUNTER
Pt called with concerns on incision bleeding and getting onto her clothes. It is not excessive/dripping down leg just getting onto pants that is touching the incision. She is going to add another bandage/couple layers of gauze and will call back if it happens to saturate through extra bandages.

## 2023-08-01 RX ORDER — NALTREXONE HYDROCHLORIDE AND BUPROPION HYDROCHLORIDE 8; 90 MG/1; MG/1
TABLET, EXTENDED RELEASE ORAL
COMMUNITY
Start: 2023-07-10

## 2023-08-03 ENCOUNTER — OFFICE VISIT (OUTPATIENT)
Dept: SURGERY | Age: 48
End: 2023-08-03

## 2023-08-03 VITALS
HEIGHT: 67 IN | DIASTOLIC BLOOD PRESSURE: 70 MMHG | WEIGHT: 229 LBS | SYSTOLIC BLOOD PRESSURE: 110 MMHG | BODY MASS INDEX: 35.94 KG/M2

## 2023-08-03 DIAGNOSIS — Z98.890 POST-OPERATIVE STATE: Primary | ICD-10-CM

## 2023-08-03 PROCEDURE — 99024 POSTOP FOLLOW-UP VISIT: CPT | Performed by: SURGERY

## 2023-08-03 NOTE — PROGRESS NOTES
[facility] GENERAL SURGERY      S:   Patient presents s/p excision of right thigh lipoma. She reports doing well. O:   Comfortable         Incision site healing well. A:   S/P excision of right thigh lipoma    P:   Follow up as needed.

## 2023-08-18 ENCOUNTER — TELEMEDICINE (OUTPATIENT)
Dept: PULMONOLOGY | Age: 48
End: 2023-08-18
Payer: COMMERCIAL

## 2023-08-18 ENCOUNTER — TELEPHONE (OUTPATIENT)
Dept: PULMONOLOGY | Age: 48
End: 2023-08-18

## 2023-08-18 DIAGNOSIS — R53.83 OTHER FATIGUE: ICD-10-CM

## 2023-08-18 DIAGNOSIS — G25.81 RLS (RESTLESS LEGS SYNDROME): ICD-10-CM

## 2023-08-18 DIAGNOSIS — E66.9 OBESITY (BMI 30-39.9): ICD-10-CM

## 2023-08-18 DIAGNOSIS — G47.33 MILD OBSTRUCTIVE SLEEP APNEA: Primary | ICD-10-CM

## 2023-08-18 DIAGNOSIS — Z71.89 CPAP USE COUNSELING: ICD-10-CM

## 2023-08-18 PROCEDURE — 99214 OFFICE O/P EST MOD 30 MIN: CPT | Performed by: NURSE PRACTITIONER

## 2023-08-18 ASSESSMENT — SLEEP AND FATIGUE QUESTIONNAIRES
ESS TOTAL SCORE: 10
HOW LIKELY ARE YOU TO NOD OFF OR FALL ASLEEP IN A CAR, WHILE STOPPED FOR A FEW MINUTES IN TRAFFIC: 1
HOW LIKELY ARE YOU TO NOD OFF OR FALL ASLEEP WHEN YOU ARE A PASSENGER IN A CAR FOR AN HOUR WITHOUT A BREAK: 3
HOW LIKELY ARE YOU TO NOD OFF OR FALL ASLEEP WHILE WATCHING TV: 1
HOW LIKELY ARE YOU TO NOD OFF OR FALL ASLEEP WHILE SITTING QUIETLY AFTER LUNCH WITHOUT ALCOHOL: 1
HOW LIKELY ARE YOU TO NOD OFF OR FALL ASLEEP WHILE SITTING AND READING: 1
HOW LIKELY ARE YOU TO NOD OFF OR FALL ASLEEP WHILE SITTING AND TALKING TO SOMEONE: 0
HOW LIKELY ARE YOU TO NOD OFF OR FALL ASLEEP WHILE SITTING INACTIVE IN A PUBLIC PLACE: 0
HOW LIKELY ARE YOU TO NOD OFF OR FALL ASLEEP WHILE LYING DOWN TO REST IN THE AFTERNOON WHEN CIRCUMSTANCES PERMIT: 3

## 2023-08-18 NOTE — PROGRESS NOTES
Patient ID: Elaine Pope is a 53 y.o. female who is being seen today for   Chief Complaint   Patient presents with    CPAP/BiPAP     6 month ry cr scanned      Referring: Dr. Paul Fuller    HPI:     Elaine Pope is a 53 y.o. female for televideo appointment via video and audio virtual visit for RY follow up. States she is doing pretty well with CPAP . States she is sleeping better but is working more. Patient is using CPAP  8 hrs/night. Using humidifier. No snoring on CPAP. The pressure is well tolerated. The mask is comfortable- nasal mask. Some mask leak. No significant daytime sleepiness. Denies nodding off when driving. No dry nose or throat. Some fatigue-more with increased work hours recently. Bedtime is 1030 pm and rise time is 6 am. Sleep onset is few minutes. Wakes up 2-3 times at night total. No nocturia. It takes few minutes to fall back a sleep. No naps during the day. No headache in am. No weight gain. 1-2 caffienated beverages during the day. Occasional alcohol. ESS is 10    States she did get repeat ferritin level however got it at [address] [address] so I do not have the results    Previous HPI 2/14/23  Elaine Pope is a 53 y.o. female for televideo appointment via video and audio virtual visit for RY follow up. States she is doing well with CPAP. States she is still tired. States morning are rough getting out of bed. States still feels restless, wakes through the night. States PCP did just change her antidepressant/anxiety medication. She plans to see gynecologist soon to discuss perimenopause symptoms. Patient is using CPAP 8 hrs/night. Using humidifier. No snoring on CPAP. The pressure is well tolerated. The mask is comfortable- nasal mask. No mask leak. Some daytime sleepiness. Denies nodding off when driving. No dry nose or throat.  +fatigue. Bedtime is  pm and rise time is 6-7 am. Sleep onset is usually <20 minutes. Wakes up 3-4 times at night total. No nocturia.  It

## 2023-08-18 NOTE — TELEPHONE ENCOUNTER
Patient had ferritin level done at outside lab she is supposed to be having results faxed. Please watch for results.

## 2023-10-05 NOTE — TELEPHONE ENCOUNTER
LM on pts VM ok per hippa leave normal test results advised that the Ferritin levels are in good standing.

## 2024-05-06 SDOH — HEALTH STABILITY: PHYSICAL HEALTH: ON AVERAGE, HOW MANY MINUTES DO YOU ENGAGE IN EXERCISE AT THIS LEVEL?: 0 MIN

## 2024-05-06 SDOH — HEALTH STABILITY: PHYSICAL HEALTH: ON AVERAGE, HOW MANY DAYS PER WEEK DO YOU ENGAGE IN MODERATE TO STRENUOUS EXERCISE (LIKE A BRISK WALK)?: 0 DAYS

## 2024-05-08 ENCOUNTER — TELEPHONE (OUTPATIENT)
Dept: ORTHOPEDIC SURGERY | Age: 49
End: 2024-05-08

## 2024-05-08 ENCOUNTER — OFFICE VISIT (OUTPATIENT)
Dept: ORTHOPEDIC SURGERY | Age: 49
End: 2024-05-08

## 2024-05-08 VITALS — BODY MASS INDEX: 35 KG/M2 | RESPIRATION RATE: 16 BRPM | HEIGHT: 67 IN | WEIGHT: 223 LBS

## 2024-05-08 DIAGNOSIS — S46.912A ELBOW STRAIN, LEFT, INITIAL ENCOUNTER: ICD-10-CM

## 2024-05-08 DIAGNOSIS — T14.90XA INJURY: Primary | ICD-10-CM

## 2024-05-08 RX ORDER — VENLAFAXINE HYDROCHLORIDE 75 MG/1
CAPSULE, EXTENDED RELEASE ORAL
COMMUNITY
Start: 2024-04-11

## 2024-05-08 NOTE — PROGRESS NOTES
CHIEF COMPLAINT: Left elbow pain.    DATE OF INJURY: 1/18/24    History:   Lori Espinosa is a 48 y.o. right handed female self-referred for evaluation and treatment of left elbow pain. This is evaluated as a workers compensation injury. Pain began 4 months ago. Pain is rated as a 2/10.   There was a history of injury. She slipped after pushing open a door and fell, bracing herself with her left hand on an outstretched arm. She twisted her ankle at the time as well. She developed elbow pain after the fall. She has pain with lifting items such as a gallon of milk and getting her elbow into full extension. She notes general soreness and weakness.   The patient has not had PT. The patient has not had an injection. The patient has tried NSAIDs. Patient's occupation is at [employer].      Outside reports reviewed: none.      Past Medical History:   Diagnosis Date    Depression     Insomnia     RY on CPAP        Past Surgical History:   Procedure Laterality Date    COLONOSCOPY      ENDOMETRIAL BIOPSY      LEG BIOPSY EXCISION Right 7/18/2023    EXCISION OF RIGHT THIGH LIPOMA performed by Alex Meza MD at [facility] OR    LIPOMA RESECTION Right 07/18/2023    EXCISION OF RIGHT THIGH LIPOMA - Right    SHOULDER SURGERY Right 11/29/2016       No family history on file.    Social History     Socioeconomic History    Marital status:    Tobacco Use    Smoking status: Never     Passive exposure: Never    Smokeless tobacco: Never   Vaping Use    Vaping Use: Never used   Substance and Sexual Activity    Alcohol use: Yes     Comment: occasionally    Drug use: No    Sexual activity: Yes     Partners: Male     Social Determinants of Health     Physical Activity: Inactive (5/6/2024)    Exercise Vital Sign     Days of Exercise per Week: 0 days     Minutes of Exercise per Session: 0 min       Current Outpatient Medications   Medication Sig Dispense Refill    naltrexone-buPROPion (CONTRAVE) 8-90 MG per extended release tablet Take by

## 2024-05-08 NOTE — TELEPHONE ENCOUNTER
Please submit C9 request for the following:    DX: S46.912A left elbow strain    Once DX has been added to claim, please submit for    Occupational Therapy for the elbow, 1-2 times a week for 6 weeks (order in chart)

## 2024-05-15 ENCOUNTER — OFFICE VISIT (OUTPATIENT)
Dept: ORTHOPEDIC SURGERY | Age: 49
End: 2024-05-15

## 2024-05-15 ENCOUNTER — TELEPHONE (OUTPATIENT)
Dept: ORTHOPEDIC SURGERY | Age: 49
End: 2024-05-15

## 2024-05-15 VITALS — RESPIRATION RATE: 16 BRPM | WEIGHT: 222 LBS | HEIGHT: 67 IN | BODY MASS INDEX: 34.84 KG/M2

## 2024-05-15 DIAGNOSIS — T14.90XA INJURY: Primary | ICD-10-CM

## 2024-05-15 DIAGNOSIS — S93.492A SPRAIN OF ANTERIOR TALOFIBULAR LIGAMENT OF LEFT ANKLE, INITIAL ENCOUNTER: ICD-10-CM

## 2024-05-15 DIAGNOSIS — S96.912A STRAIN OF LEFT ANKLE, INITIAL ENCOUNTER: ICD-10-CM

## 2024-05-15 NOTE — TELEPHONE ENCOUNTER
Please submit C9 request for the following:    DX: S93.492A; sprain anterior talofibular ligament sprain left ankle  Wraptor ankle brace,   Physical therapy, 1-2 times a week for 6 weeks, order in chart

## 2024-05-23 ENCOUNTER — TELEPHONE (OUTPATIENT)
Dept: ORTHOPEDIC SURGERY | Age: 49
End: 2024-05-23

## 2024-05-23 NOTE — TELEPHONE ENCOUNTER
LM for patient that we received authorization and asked that they call [facility] at [phone] to schedule

## 2024-06-03 ENCOUNTER — HOSPITAL ENCOUNTER (OUTPATIENT)
Dept: PHYSICAL THERAPY | Age: 49
Setting detail: THERAPIES SERIES
Discharge: HOME | End: 2024-06-03
Payer: COMMERCIAL

## 2024-06-03 PROCEDURE — 97112 NEUROMUSCULAR REEDUCATION: CPT

## 2024-06-03 PROCEDURE — 97110 THERAPEUTIC EXERCISES: CPT

## 2024-06-03 PROCEDURE — 97161 PT EVAL LOW COMPLEX 20 MIN: CPT

## 2024-06-03 NOTE — PLAN OF CARE
POC: [x] Good [] Fair  [] Poor    Patient requires continued skilled intervention: [x] Yes  [] No      CHARGE CAPTURE     PT WORKERS COMP GRID:   CPT Code (TIMED) # Time In Time Out Total Time CPT Code (UNTIMED) #    Therex (32987)  1 820 835 15  EVAL:LOW (13793 - Typically 20 minutes face-to-face) 1    Neuromusc. Re-ed (82794) 1 835 845 10  Re-Eval (75397)     Manual (44967)      Estim Unattended (22445)     Ther. Act (46294)      Mech. Traction (02586)     Gait (43073)      Dry Needle 1-2 muscle (18742)     Aquatic Therex (89045)      Dry Needle 3+ muscle (52177)     Iontophoresis (28497)      VASO (43636)     Ultrasound (68065)      Group Therapy (73777)     Estim Attended (60079)      Canalith Repositioning (90572)     Other:      Other:    Totals   2   25  1     Total Treatment Minutes 45       Charge Justification:  (17763) THERAPEUTIC EXERCISE - Provided verbal/tactile cueing for activities related to strengthening, flexibility, endurance, ROM performed to prevent loss of range of motion, maintain or improve muscular strength or increase flexibility, following either an injury or surgery.   (44614) HOME EXERCISE PROGRAM - Reviewed/Progressed HEP activities related to strengthening, flexibility, endurance, ROM performed to prevent loss of range of motion, maintain or improve muscular strength or increase flexibility, following either an injury or surgery.  (31887) NEUROMUSCULAR RE-EDUCATION - Therapeutic procedure, 1 or more areas, each 15 minutes; neuromuscular reeducation of movement, balance, coordination, kinesthetic sense, posture, and/or proprioception for sitting and/or standing activities  (78432) HOME EXERCISE PROGRAM - Reviewed/Progressed HEP activities related to neuromuscular reeducation of movement, balance, coordination, kinesthetic sense, posture, and/or proprioception for sitting and/or standing activities    (67269) THERAPEUTIC ACTIVITY - use of dynamic activities to improve functional

## 2024-06-05 ENCOUNTER — HOSPITAL ENCOUNTER (OUTPATIENT)
Dept: OCCUPATIONAL THERAPY | Age: 49
Setting detail: THERAPIES SERIES
Discharge: HOME | End: 2024-06-05
Payer: COMMERCIAL

## 2024-06-05 PROCEDURE — 97165 OT EVAL LOW COMPLEX 30 MIN: CPT | Performed by: OCCUPATIONAL THERAPIST

## 2024-06-05 NOTE — PLAN OF CARE
[facility]- Outpatient Rehabilitation and Therapy [address], [place] [address] office: [phone] fax: [phone]     Occupational Therapy Initial Evaluation Certification      Dear Nabeel Blanchard MD,    We had the pleasure of evaluating the following patient for occupational therapy services at [facility] Occupational Therapy.  A summary of our findings can be found in the initial assessment below.  This includes our plan of care.  If you have any questions or concerns regarding these findings, please do not hesitate to contact me at the office phone number listed above.  Thank you for the referral.     Physician Signature:_______________________________Date:__________________  By signing above (or electronic signature), therapist’s plan is approved by physician       Occupational Therapy: TREATMENT/PROGRESS NOTE   Patient: Lori Espinosa (48 y.o. female)   Examination Date: 2024   :  1975 MRN: [id number]   Visit #: 1  Insurance Allowable Auth Needed    [facility]  until 24 [x]Yes    []No    Insurance: Payor: PMA / Plan: PMA WC / Product Type: *No Product type* /   Insurance ID: [id number] - (Worker's Comp)  Secondary Insurance (if applicable): BCBS   Treatment Diagnosis: L elbow pain M25.522  No diagnosis found.   Medical Diagnosis:  No admission diagnoses are documented for this encounter.   Referring Physician: Nabeel Blanchard MD  PCP: Rom Alves MD     DOI:[id number]    DOS:N/A    Plan of care signed (Y/N):     Date of Patient follow up with Physician:      Nahomi Date:   Progress Report/POC: EVAL today  POC update due: (10 visits /OR AUTH LIMITS, whichever is less)  2024                                             Precautions/ Contra-indications:           Latex allergy:  NO  Pacemaker:    NO  Contraindications for Manipulation: None  Other:    Red Flags:  None    C-SSRS Triggered by Intake questionnaire:   [x]

## 2024-06-10 ENCOUNTER — HOSPITAL ENCOUNTER (OUTPATIENT)
Dept: PHYSICAL THERAPY | Age: 49
Setting detail: THERAPIES SERIES
Discharge: HOME | End: 2024-06-10
Payer: COMMERCIAL

## 2024-06-10 ENCOUNTER — APPOINTMENT (OUTPATIENT)
Dept: OCCUPATIONAL THERAPY | Age: 49
End: 2024-06-10
Payer: COMMERCIAL

## 2024-06-10 PROCEDURE — 97110 THERAPEUTIC EXERCISES: CPT

## 2024-06-10 PROCEDURE — 97140 MANUAL THERAPY 1/> REGIONS: CPT

## 2024-06-10 PROCEDURE — 97016 VASOPNEUMATIC DEVICE THERAPY: CPT

## 2024-06-10 PROCEDURE — 97112 NEUROMUSCULAR REEDUCATION: CPT

## 2024-06-10 NOTE — FLOWSHEET NOTE
[facility]- Outpatient Rehabilitation and Therapy [address], [place] [address] office: [phone] fax: [phone]         Physical Therapy: TREATMENT/PROGRESS NOTE   Patient: Lori Espinosa (48 y.o. female)   Examination Date: 06/10/2024   :  1975 MRN: [id number]   Visit #: 2   Insurance Allowable Auth Needed    through 24 [x]Yes    []No    Insurance: Payor: PMA / Plan: PMA WC / Product Type: *No Product type* /   Insurance ID: [id number] - (Worker's Comp)  Secondary Insurance (if applicable):    Treatment Diagnosis: L ankle pain  No diagnosis found.   Medical Diagnosis:  Strain of left ankle, initial encounter [S96.912A]  Sprain of anterior talofibular ligament of left ankle, initial encounter [S93.492A]   Referring Physician: Nabeel Blanchard MD  PCP: Rom Alves MD     Plan of care signed (Y/N):     Date of Patient follow up with Physician:      Progress Report/POC: YES, Date Range for this report: 6/3/24 to 9/3/24  POC update due: (10 visits /OR AUTH LIMITS, whichever is less)  7/3/2024                                             Precautions/ Contra-indications:           Latex allergy:  NO  Pacemaker:    NO  Contraindications for Manipulation: None  Date of Surgery: na  Other:    Red Flags:  None    C-SSRS Triggered by Intake questionnaire:   Patient answered 'NO' to both behavioral questions on intake.  No further screening warranted    Preferred Language for Healthcare:   [x] English       [] other:    SUBJECTIVE EXAMINATION     Patient stated complaint: Pt reports her ankle is a little sore today.         Test used Initial score  6/3/24 06/10/2024   Pain Summary VAS 1-5 2   Functional questionnaire LEFS NA    Other:              Exercises/Interventions     Therapeutic Ex (82069)  resistance Sets/time Reps Notes/Cues/Progressions   Bike  6'     Resisted eversion G versa 2 10    Standing HR eccentric focus  2 10    Gastroc S  20''

## 2024-06-11 ENCOUNTER — HOSPITAL ENCOUNTER (OUTPATIENT)
Dept: OCCUPATIONAL THERAPY | Age: 49
Setting detail: THERAPIES SERIES
Discharge: HOME | End: 2024-06-11
Payer: COMMERCIAL

## 2024-06-11 PROCEDURE — 97140 MANUAL THERAPY 1/> REGIONS: CPT | Performed by: OCCUPATIONAL THERAPIST

## 2024-06-11 PROCEDURE — 97035 APP MDLTY 1+ULTRASOUND EA 15: CPT | Performed by: OCCUPATIONAL THERAPIST

## 2024-06-11 PROCEDURE — 97110 THERAPEUTIC EXERCISES: CPT | Performed by: OCCUPATIONAL THERAPIST

## 2024-06-11 PROCEDURE — 20560 NDL INSJ W/O NJX 1 OR 2 MUSC: CPT | Performed by: OCCUPATIONAL THERAPIST

## 2024-06-11 NOTE — PLAN OF CARE
[facility]- Outpatient Rehabilitation and Therapy [address], [place] [address] office: [phone] fax: [phone]     Occupational Therapy Initial Evaluation Certification      Dear Nabeel Blanchard MD,    We had the pleasure of evaluating the following patient for occupational therapy services at [facility] Out[facility] Occupational Therapy.  A summary of our findings can be found in the initial assessment below.  This includes our plan of care.  If you have any questions or concerns regarding these findings, please do not hesitate to contact me at the office phone number listed above.  Thank you for the referral.     Physician Signature:_______________________________Date:__________________  By signing above (or electronic signature), therapist’s plan is approved by physician       Occupational Therapy: TREATMENT/PROGRESS NOTE   Patient: Lori Espinosa (48 y.o. female)   Examination Date: 2024   :  1975 MRN: [id number]   Visit #: 2  Insurance Allowable Auth Needed    [facility]  until 24 [x]Yes    []No    Insurance: Payor: PMA / Plan: PMA WC / Product Type: *No Product type* /   Insurance ID: [id number] - (Worker's Comp)  Secondary Insurance (if applicable):    Treatment Diagnosis: L elbow pain M25.522  No diagnosis found.   Medical Diagnosis:  No admission diagnoses are documented for this encounter.   Referring Physician: Nabeel Blanchard MD  PCP: Rom Alves MD     DOI:24    DOS:N/A    Plan of care signed (Y/N):     Date of Patient follow up with Physician:      Nahomi Date:   Progress Report/POC: EVAL today  POC update due: (10 visits /OR AUTH LIMITS, whichever is less)  2024                                             Precautions/ Contra-indications:           Latex allergy:  NO  Pacemaker:    NO  Contraindications for Manipulation: None  Other:    Red Flags:  None    C-SSRS Triggered by Intake questionnaire:   [x] No,

## 2024-06-14 ENCOUNTER — HOSPITAL ENCOUNTER (OUTPATIENT)
Dept: OCCUPATIONAL THERAPY | Age: 49
Setting detail: THERAPIES SERIES
Discharge: HOME | End: 2024-06-14
Payer: COMMERCIAL

## 2024-06-14 PROCEDURE — 97035 APP MDLTY 1+ULTRASOUND EA 15: CPT | Performed by: OCCUPATIONAL THERAPIST

## 2024-06-14 PROCEDURE — 20560 NDL INSJ W/O NJX 1 OR 2 MUSC: CPT | Performed by: OCCUPATIONAL THERAPIST

## 2024-06-14 PROCEDURE — 97140 MANUAL THERAPY 1/> REGIONS: CPT | Performed by: OCCUPATIONAL THERAPIST

## 2024-06-14 PROCEDURE — 97110 THERAPEUTIC EXERCISES: CPT | Performed by: OCCUPATIONAL THERAPIST

## 2024-06-14 NOTE — PLAN OF CARE
Questionnaire did not trigger screening.   [] Yes, Patient intake triggered further evaluation      [] C-SSRS Screening completed  [] PCP notified via Plan of Care  [] Emergency services notified     Preferred Language for Healthcare:   [x] English       [] other:    SUBJECTIVE EXAMINATION     Patient stated complaint: Pt reports decreased pain on lateral side of elbow. Reports pain more on medial side today.      Test used Initial score  6/5/24 06/14/2024   Pain Summary VAS 4/10    Functional questionnaire Quick DASH 36%    Other:                Other Co-morbidities not listed:       OBJECTIVE EXAMINATION     Hand Dominance: right    Objective Measures: 06/14/2024   PAIN 4/10   Quick DASH 36%   Digits tip to DPFC in cm WNL   Thumb ROM WNL   Wrist ROM Ext/Flex WF   Rad/Uln dev ROM R:L  L:   Forearm ROM  Sup/pron WNL   Elbow ROM Ext/flex WNL:   Shoulder Flex  Shoulder Abd  Shoulder IR/ER   WNL   Edema in cm circumf.  MCPJs R:  L:   Edema in cm circumf.  Wrist R:  L:    strength in lbs R:72#  L:73#   Pinch Strengthin lbs: lat  R:  L:   Pinch Strength in lbs:  3 point R:  L:     MMT:     9 hole peg test in seconds R:  L:     Observations:  (including splints, bandages, incisions, scars):      Sensation:  [] No reported deficits  [] Intact to light touch    [] [place] test completed, findings as noted:  [x] Other:numbness and tingling at little finger    Palpation: tender over radial tunnel    Patient-Identified Primary Performance Deficits (to be addressed in POC):   [] bathing    [x] household tasks (cooking/cleaning)   [] dressing    [] self feeding   [] grooming    [] work/education   [] functional mobility   [x] sleeping/rest   [] toileting/hygiene   [] recreational activities   [x] driving    [] community/social participation   [x] other:     Functional Mobility/Transfers/Gait:  [] Independent - no significant gait deviations  [] Assistance needed   [] Assistive device used:     Falls Risk Assessment

## 2024-06-18 ENCOUNTER — HOSPITAL ENCOUNTER (OUTPATIENT)
Dept: PHYSICAL THERAPY | Age: 49
Setting detail: THERAPIES SERIES
Discharge: HOME | End: 2024-06-18
Payer: COMMERCIAL

## 2024-06-18 PROCEDURE — 97110 THERAPEUTIC EXERCISES: CPT

## 2024-06-18 PROCEDURE — 97112 NEUROMUSCULAR REEDUCATION: CPT

## 2024-06-18 NOTE — FLOWSHEET NOTE
recreational activities.   [] Progressing: [] Met: [] Not Met: [] Adjusted  4. Patient will return to work like activities without increased symptoms or restriction.   [] Progressing: [] Met: [] Not Met: [] Adjusted  5. Pt will demonstrate a normalized gait pattern with 0/10 pain. (patient specific functional goal)    [] Progressing: [] Met: [] Not Met: [] Adjusted     Overall Progression Towards Functional goals/ Treatment Progress Update:  [] Patient is progressing as expected towards functional goals listed.    [] Progression is slowed due to complexities/Impairments listed.  [] Progression has been slowed due to co-morbidities.  [x] Plan just implemented, too soon (<30days) to assess goals progression   [] Goals require adjustment due to lack of progress  [] Patient is not progressing as expected and requires additional follow up with physician  [] Other:     TREATMENT PLAN     Frequency/Duration: 2x/week for  12  weeks for the following treatment interventions:    Interventions:  Therapeutic Exercise (39398) including: strength training, ROM, and functional mobility  Therapeutic Activities (18916) including: functional mobility training and education.  Neuromuscular Re-education (68484) activation and proprioception, including postural re-education.    Gait Training (75762) for normalization of ambulation patterns and AD training.   Manual Therapy (34818) as indicated to include: Passive Range of Motion, Gr I-IV mobilizations, Grade V Manipulation, Soft Tissue Mobilization, and Dry Needling/IASTM  Modalities as needed that may include: Cryotherapy, Electrical Stimulation, Thermal Agents, and Vasoneumatic Compression  Patient education on joint protection, postural re-education, activity modification, and progression of HEP    Plan: POC initiated as per evaluation    Electronically Signed by Dick James PT  Date: 06/18/2024     Note: Portions of this note have been templated and/or copied from initial

## 2024-06-19 ENCOUNTER — HOSPITAL ENCOUNTER (OUTPATIENT)
Dept: OCCUPATIONAL THERAPY | Age: 49
Setting detail: THERAPIES SERIES
Discharge: HOME | End: 2024-06-19
Payer: COMMERCIAL

## 2024-06-19 PROCEDURE — 97140 MANUAL THERAPY 1/> REGIONS: CPT | Performed by: OCCUPATIONAL THERAPIST

## 2024-06-19 PROCEDURE — 20560 NDL INSJ W/O NJX 1 OR 2 MUSC: CPT | Performed by: OCCUPATIONAL THERAPIST

## 2024-06-19 PROCEDURE — 97035 APP MDLTY 1+ULTRASOUND EA 15: CPT | Performed by: OCCUPATIONAL THERAPIST

## 2024-06-19 PROCEDURE — 97110 THERAPEUTIC EXERCISES: CPT | Performed by: OCCUPATIONAL THERAPIST

## 2024-06-19 NOTE — FLOWSHEET NOTE
[facility]- Outpatient Rehabilitation and Therapy [address], [place] [address] office: [phone] fax: [phone]     Occupational Therapy Initial Evaluation Certification      Dear Nabeel Blanchard MD,    We had the pleasure of evaluating the following patient for occupational therapy services at [facility] Out[facility] Occupational Therapy.  A summary of our findings can be found in the initial assessment below.  This includes our plan of care.  If you have any questions or concerns regarding these findings, please do not hesitate to contact me at the office phone number listed above.  Thank you for the referral.     Physician Signature:_______________________________Date:__________________  By signing above (or electronic signature), therapist’s plan is approved by physician       Occupational Therapy: TREATMENT/PROGRESS NOTE   Patient: Lori Espinosa (48 y.o. female)   Examination Date: 2024   :  1975 MRN: [id number]   Visit #: 4  Insurance Allowable Auth Needed    [facility]  until 24 [x]Yes    []No    Insurance: Payor: PMA / Plan: PMA WC / Product Type: *No Product type* /   Insurance ID: [id number] - (Worker's Comp)  Secondary Insurance (if applicable):    Treatment Diagnosis: L elbow pain M25.522  No diagnosis found.   Medical Diagnosis:  No admission diagnoses are documented for this encounter.   Referring Physician: Nabeel Blanchard MD  PCP: Rom Alves MD     DOI:24    DOS:N/A    Plan of care signed (Y/N):     Date of Patient follow up with Physician:      Nahomi Date:   Progress Report/POC: EVAL today  POC update due: (10 visits /OR AUTH LIMITS, whichever is less)  2024                                             Precautions/ Contra-indications:           Latex allergy:  NO  Pacemaker:    NO  Contraindications for Manipulation: None  Other:    Red Flags:  None    C-SSRS Triggered by Intake questionnaire:   [x] No,

## 2024-06-20 ENCOUNTER — HOSPITAL ENCOUNTER (OUTPATIENT)
Dept: PHYSICAL THERAPY | Age: 49
Setting detail: THERAPIES SERIES
Discharge: HOME | End: 2024-06-20
Payer: COMMERCIAL

## 2024-06-20 PROCEDURE — 97110 THERAPEUTIC EXERCISES: CPT

## 2024-06-20 PROCEDURE — 97112 NEUROMUSCULAR REEDUCATION: CPT

## 2024-06-20 NOTE — FLOWSHEET NOTE
copied from initial evaluation, reassessments and prior notes for documentation efficiency.    Note: If patient does not return for scheduled/recommended follow up visits, this note will serve as a discharge from care along with the most recent update on progress.    Ortho Evaluation

## 2024-06-24 ENCOUNTER — HOSPITAL ENCOUNTER (OUTPATIENT)
Dept: OCCUPATIONAL THERAPY | Age: 49
Setting detail: THERAPIES SERIES
Discharge: HOME | End: 2024-06-24
Payer: COMMERCIAL

## 2024-06-24 ENCOUNTER — HOSPITAL ENCOUNTER (OUTPATIENT)
Dept: PHYSICAL THERAPY | Age: 49
Setting detail: THERAPIES SERIES
Discharge: HOME | End: 2024-06-24
Payer: COMMERCIAL

## 2024-06-24 PROCEDURE — 20560 NDL INSJ W/O NJX 1 OR 2 MUSC: CPT | Performed by: OCCUPATIONAL THERAPIST

## 2024-06-24 PROCEDURE — 97110 THERAPEUTIC EXERCISES: CPT | Performed by: OCCUPATIONAL THERAPIST

## 2024-06-24 PROCEDURE — 97140 MANUAL THERAPY 1/> REGIONS: CPT | Performed by: OCCUPATIONAL THERAPIST

## 2024-06-24 PROCEDURE — 97112 NEUROMUSCULAR REEDUCATION: CPT

## 2024-06-24 PROCEDURE — 97110 THERAPEUTIC EXERCISES: CPT

## 2024-06-24 NOTE — FLOWSHEET NOTE
[facility]- Outpatient Rehabilitation and Therapy [address], [place] [address] office: [phone] fax: [phone]         Physical Therapy: TREATMENT/PROGRESS NOTE   Patient: Lori Espinosa (48 y.o. female)   Examination Date: 2024   :  1975 MRN: [id number]   Visit #: 5   Insurance Allowable Auth Needed    through 24 [x]Yes    []No    Insurance: Payor: PMA / Plan: PMA WC / Product Type: *No Product type* /   Insurance ID: [id number] - (Worker's Comp)  Secondary Insurance (if applicable):    Treatment Diagnosis: L ankle pain  No diagnosis found.   Medical Diagnosis:  Strain of left ankle, initial encounter [S96.912A]  Sprain of anterior talofibular ligament of left ankle, initial encounter [S93.492A]   Referring Physician: Nabeel Blanchard MD  PCP: Rom Alves MD     Plan of care signed (Y/N):     Date of Patient follow up with Physician:      Progress Report/POC: YES, Date Range for this report: 6/3/24 to 9/3/24  POC update due: (10 visits /OR AUTH LIMITS, whichever is less)  7/3/2024                                             Precautions/ Contra-indications:           Latex allergy:  NO  Pacemaker:    NO  Contraindications for Manipulation: None  Date of Surgery: na  Other:    Red Flags:  None    C-SSRS Triggered by Intake questionnaire:   Patient answered 'NO' to both behavioral questions on intake.  No further screening warranted    Preferred Language for Healthcare:   [x] English       [] other:    SUBJECTIVE EXAMINATION     Patient stated complaint: Pt reports her ankle is feeling good.           Test used Initial score  6/3/24 2024   Pain Summary VAS 1-5 0   Functional questionnaire LEFS NA    Other:              Exercises/Interventions     Therapeutic Ex (45615)  resistance Sets/time Reps Notes/Cues/Progressions   Bike  6'     Resisted eversion G versa 2 10    Standing HR eccentric focus up 2 down 1  3 10    Gastroc

## 2024-06-24 NOTE — FLOWSHEET NOTE
therapy services:  The patient has functional impairments and/or activity limitations and would benefit from continued outpatient therapy services to address the deficits outlined in the patients goals  The patient has a musculoskeletal condition(s) with a corresponding ICD-10 code that is of complexity and severity that require skilled therapeutic intervention. This has a direct and significant impact on the need for therapy and significantly impacts the rate of recovery.   The patient has a complexity identified by an ICD-10 code that has a direct and significant impact on the need for therapy.  (Significantly impacts the rate of recovery and is associated with a primary condition.)       Return to Play: NA    Prognosis for POC: [x] Good [] Fair  [] Poor    Patient requires continued skilled intervention: [x] Yes  [] No      CHARGE CAPTURE     OT CHARGE GRID   CPT Code (TIMED) minutes # CPT Code (UNTIMED) #     Therex (18756)  10 1  EVAL:LOW (67847 - Typically 30 minutes face-to-face)     Neuromusc. Re-ed (85879)    Re-Eval (54220)     Manual (66293) 12 1  Estim Unattended (62940)     Ther. Act (97928)    Parraffin (47610)     Gait (96746)    Fluidotherapy (88617)     ADL Training (10881)    Dry Needle 1-2 muscle (40857) 1    Iontophoresis (36480)    Dry Needle 3+ muscle (92689)     Ultrasound (09807) 8   VASO (31562)     Estim Attended (62867)    Group Therapy (31179)     Other:    Other:    Total Timed Code Tx Minutes 30 2  1     L Code: NA    Total Treatment Minutes 40        Charge Justification:  N/A - EVAL ONLY      GOALS     Patient stated goal: To lessen pain    [] Progressing: [] Met: [] Not Met: [] Adjusted    Therapist goals for Patient:   Short Term Goals: To be achieved in 2 weeks  1. Independent in HEP and progression per patient tolerance, in order to prevent re-injury.     [] Progressing: [] Met: [] Not Met: [] Adjusted  2. Patient will have a decrease in pain to facilitate improvement in movement,

## 2024-07-01 ENCOUNTER — HOSPITAL ENCOUNTER (OUTPATIENT)
Dept: OCCUPATIONAL THERAPY | Age: 49
Setting detail: THERAPIES SERIES
Discharge: HOME | End: 2024-07-01
Payer: COMMERCIAL

## 2024-07-01 ENCOUNTER — HOSPITAL ENCOUNTER (OUTPATIENT)
Dept: PHYSICAL THERAPY | Age: 49
Setting detail: THERAPIES SERIES
Discharge: HOME | End: 2024-07-01
Payer: COMMERCIAL

## 2024-07-01 PROCEDURE — 97110 THERAPEUTIC EXERCISES: CPT

## 2024-07-01 PROCEDURE — 97140 MANUAL THERAPY 1/> REGIONS: CPT | Performed by: OCCUPATIONAL THERAPIST

## 2024-07-01 PROCEDURE — 97112 NEUROMUSCULAR REEDUCATION: CPT

## 2024-07-01 PROCEDURE — 20560 NDL INSJ W/O NJX 1 OR 2 MUSC: CPT | Performed by: OCCUPATIONAL THERAPIST

## 2024-07-01 PROCEDURE — 97110 THERAPEUTIC EXERCISES: CPT | Performed by: OCCUPATIONAL THERAPIST

## 2024-07-01 NOTE — FLOWSHEET NOTE
achieved in 2 weeks  1. Independent in HEP and progression per patient tolerance, in order to prevent re-injury.     [] Progressing: [] Met: [] Not Met: [] Adjusted  2. Patient will have a decrease in pain to facilitate improvement in movement, function, and ADLs as indicated by Functional Deficits.    [] Progressing: [] Met: [] Not Met: [] Adjusted    Long Term Goals: To be achieved in 6 weeks (through 7/18/24), including patient directed goals to address patient identified performance deficits:  1. Pt to be independent in graded HEP progression with a good level of effort and compliance.  [] Progressing: [] Met: [] Not Met: [] Adjusted  2. Pt to report a score of </= 20 % on the Quick DASH disability questionnaire for increased performance with carrying, moving, and handling objects.  [] Progressing: [] Met: [] Not Met: [] Adjusted  4. Pt will demonstrate increased functional strength to tolerate at least 30-45min of moderately resistive exercise activity  for improved independence with driving, home mgt tasks, and vocational responsibilities.  [] Progressing: [] Met: [] Not Met: [] Adjusted  5. Pt will have a decrease in pain to 2/10 to facilitate sleeping, driving, home mgt tasks, and vocational responsibilities..  [] Progressing: [] Met: [] Not Met: [] Adjusted    TREATMENT PLAN     Frequency/Duration: 1-2x/week for 6 weeks for the following treatment interventions:    Interventions:  [x] Therapeutic exercise including: strength training, ROM, including postural re-education.   [x] NMR activation and proprioception, including postural re-education.    [x] Manual therapy as indicated to include: PROM, Gr I-IV mobilizations, dry needling and STM   [x] Modalities as needed that may include: Electrical Stimulation, Ultrasound, and Paraffin  [x] Patient education on joint protection, postural re-education, activity modification, progression of HEP.        [] Aquatic Therapy    Plan: POC initiated as per

## 2024-07-01 NOTE — FLOWSHEET NOTE
[facility]- Outpatient Rehabilitation and Therapy [address], [place] [address] office: [phone] fax: [phone]         Physical Therapy: TREATMENT/PROGRESS NOTE   Patient: Lori Espinosa (49 y.o. female)   Examination Date: 2024   :  1975 MRN: [id number]   Visit #: 6   Insurance Allowable Auth Needed   12 through 24 [x]Yes    []No    Insurance: Payor: PMA / Plan: PMA WC / Product Type: *No Product type* /   Insurance ID: [id number] - (Worker's Comp)  Secondary Insurance (if applicable):    Treatment Diagnosis: L ankle pain  No diagnosis found.   Medical Diagnosis:  Strain of left ankle, initial encounter [S96.912A]  Sprain of anterior talofibular ligament of left ankle, initial encounter [S93.492A]   Referring Physician: Nabeel Blanchard MD  PCP: Rom Alves MD     Plan of care signed (Y/N):     Date of Patient follow up with Physician:      Progress Report/POC: YES, Date Range for this report: 6/3/24 to 9/3/24  POC update due: (10 visits /OR AUTH LIMITS, whichever is less)  7/3/2024                                             Precautions/ Contra-indications:           Latex allergy:  NO  Pacemaker:    NO  Contraindications for Manipulation: None  Date of Surgery: na  Other:    Red Flags:  None    C-SSRS Triggered by Intake questionnaire:   Patient answered 'NO' to both behavioral questions on intake.  No further screening warranted    Preferred Language for Healthcare:   [x] English       [] other:    SUBJECTIVE EXAMINATION     Patient stated complaint: Pt reports her ankle is doing well.           Test used Initial score  6/3/24 2024   Pain Summary VAS 1-5 0   Functional questionnaire LEFS NA    Other:              Exercises/Interventions     Therapeutic Ex (77782)  resistance Sets/time Reps Notes/Cues/Progressions   Bike  6'     Resisted eversion G versa 2 10    Standing HR eccentric focus up 2 down 1  3 10    Gastroc S

## 2024-07-15 ENCOUNTER — HOSPITAL ENCOUNTER (OUTPATIENT)
Dept: OCCUPATIONAL THERAPY | Age: 49
Setting detail: THERAPIES SERIES
Discharge: HOME | End: 2024-07-15
Payer: COMMERCIAL

## 2024-07-15 PROCEDURE — 97110 THERAPEUTIC EXERCISES: CPT | Performed by: OCCUPATIONAL THERAPIST

## 2024-07-15 PROCEDURE — 20560 NDL INSJ W/O NJX 1 OR 2 MUSC: CPT | Performed by: OCCUPATIONAL THERAPIST

## 2024-07-15 PROCEDURE — 97140 MANUAL THERAPY 1/> REGIONS: CPT | Performed by: OCCUPATIONAL THERAPIST

## 2024-07-15 NOTE — FLOWSHEET NOTE
[facility]- Outpatient Rehabilitation and Therapy [address], [place] [address] office: [phone] fax: [phone]     Occupational Therapy Initial Evaluation Certification      Dear Nabeel Blanchard MD,    We had the pleasure of evaluating the following patient for occupational therapy services at [facility] Outpatient Occupational Therapy.  A summary of our findings can be found in the initial assessment below.  This includes our plan of care.  If you have any questions or concerns regarding these findings, please do not hesitate to contact me at the office phone number listed above.  Thank you for the referral.     Physician Signature:_______________________________Date:__________________  By signing above (or electronic signature), therapist’s plan is approved by physician       Occupational Therapy: TREATMENT/PROGRESS NOTE   Patient: Lori Espinosa (49 y.o. female)   Examination Date: 07/15/2024   :  1975 MRN: [id number]   Visit #: 7  Insurance Allowable Auth Needed    [facility]  until 24 [x]Yes    []No    Insurance: Payor: PMA / Plan: PMA WC / Product Type: *No Product type* /   Insurance ID: [id number] - (Worker's Comp)  Secondary Insurance (if applicable):    Treatment Diagnosis: L elbow pain M25.522  No diagnosis found.   Medical Diagnosis:  Strain of left ankle, initial encounter [S96.912A]  Sprain of anterior talofibular ligament of left ankle, initial encounter [S93.492A]   Referring Physician: Nabeel Blanchard MD  PCP: Rom Alves MD     DOI:[id number]    DOS:N/A    Plan of care signed (Y/N):     Date of Patient follow up with Physician:      Nahomi Date:   Progress Report/POC: EVAL today  POC update due: (10 visits /OR AUTH LIMITS, whichever is less)  2024                                             Precautions/ Contra-indications:           Latex allergy:  NO  Pacemaker:    NO  Contraindications for Manipulation:

## 2024-07-17 ENCOUNTER — OFFICE VISIT (OUTPATIENT)
Dept: ORTHOPEDIC SURGERY | Age: 49
End: 2024-07-17

## 2024-07-17 ENCOUNTER — HOSPITAL ENCOUNTER (OUTPATIENT)
Dept: OCCUPATIONAL THERAPY | Age: 49
Setting detail: THERAPIES SERIES
Discharge: HOME | End: 2024-07-17
Payer: COMMERCIAL

## 2024-07-17 VITALS — WEIGHT: 226 LBS | HEIGHT: 67 IN | RESPIRATION RATE: 16 BRPM | BODY MASS INDEX: 35.47 KG/M2

## 2024-07-17 DIAGNOSIS — S46.912A ELBOW STRAIN, LEFT, INITIAL ENCOUNTER: Primary | ICD-10-CM

## 2024-07-17 PROCEDURE — 97110 THERAPEUTIC EXERCISES: CPT | Performed by: OCCUPATIONAL THERAPIST

## 2024-07-17 PROCEDURE — 97140 MANUAL THERAPY 1/> REGIONS: CPT | Performed by: OCCUPATIONAL THERAPIST

## 2024-07-17 PROCEDURE — 97035 APP MDLTY 1+ULTRASOUND EA 15: CPT | Performed by: OCCUPATIONAL THERAPIST

## 2024-07-17 NOTE — FLOWSHEET NOTE
treatment gloves along with hand  prior to inserting the needles.  All needles where removed and discarded in the appropriate sharps container.  MD has given verbal and/or written approval for this treatment.     No modalities applied this session    Education/Home Exercise Program: HEP instruction not indicated at this time        ASSESSMENT   Today's Assessment: Pt progressing overall however cont crepitus and trigger points at triceps contribute to pain/stiffness    Medical Necessity Documentation:  I certify that this patient meets the below criteria necessary for medical necessity for care and/or justification of therapy services:  The patient has functional impairments and/or activity limitations and would benefit from continued outpatient therapy services to address the deficits outlined in the patients goals  The patient has a musculoskeletal condition(s) with a corresponding ICD-10 code that is of complexity and severity that require skilled therapeutic intervention. This has a direct and significant impact on the need for therapy and significantly impacts the rate of recovery.   The patient has a complexity identified by an ICD-10 code that has a direct and significant impact on the need for therapy.  (Significantly impacts the rate of recovery and is associated with a primary condition.)       Return to Play: NA    Prognosis for POC: [x] Good [] Fair  [] Poor    Patient requires continued skilled intervention: [x] Yes  [] No      CHARGE CAPTURE     OT CHARGE GRID   CPT Code (TIMED) minutes # CPT Code (UNTIMED) #     Therex (60192)  10 1  EVAL:LOW (89998 - Typically 30 minutes face-to-face)     Neuromusc. Re-ed (29868) 5   Re-Eval (95966)     Manual (00128) 15 1  Estim Unattended (19310)     Ther. Act (64053)    Parraffin (30257)     Gait (11041)    Fluidotherapy (12621)     ADL Training (96709)    Dry Needle 1-2 muscle (94240)     Iontophoresis (44427)    Dry Needle 3+ muscle (61410)

## 2024-07-17 NOTE — PROGRESS NOTES
CHIEF COMPLAINT: Left elbow pain.    DATE OF INJURY: 1/18/24    History:   Lori Espinosa is a 49 y.o. right handed female self-referred for evaluation and treatment of left elbow pain. This is evaluated as a workers compensation injury. Pain began 4 months ago. Pain is rated as a 2/10.   There was a history of injury. She slipped after pushing open a door and fell, bracing herself with her left hand on an outstretched arm. She twisted her ankle at the time as well. She developed elbow pain after the fall. She has pain with lifting items such as a gallon of milk and getting her elbow into full extension. She notes general soreness and weakness.   The patient has not had PT. The patient has not had an injection. The patient has tried NSAIDs. Patient's occupation is at [employer].      Interval history: The patient has been to 7 visits of OT. This has included dry needling. She reports significant improvement. She rates pain 1-2/10. She feels that she can complete her daily activities.         Past Medical History:   Diagnosis Date    Depression     Insomnia     RY on CPAP        Past Surgical History:   Procedure Laterality Date    COLONOSCOPY      ENDOMETRIAL BIOPSY      LEG BIOPSY EXCISION Right 7/18/2023    EXCISION OF RIGHT THIGH LIPOMA performed by Alex Meza MD at [facility] OR    LIPOMA RESECTION Right 07/18/2023    EXCISION OF RIGHT THIGH LIPOMA - Right    SHOULDER SURGERY Right 11/29/2016       Family History   Problem Relation Age of Onset    Diabetes Mother     Cancer Father     Cancer Paternal Uncle     Diabetes Maternal Aunt        Social History     Socioeconomic History    Marital status:      Spouse name: None    Number of children: None    Years of education: None    Highest education level: None   Tobacco Use    Smoking status: Former     Current packs/day: 0.00     Average packs/day: 0.3 packs/day for 8.0 years (2.0 ttl pk-yrs)     Types: Cigarettes     Quit date: 10/1/1999     Years since

## 2024-07-23 ENCOUNTER — HOSPITAL ENCOUNTER (OUTPATIENT)
Dept: OCCUPATIONAL THERAPY | Age: 49
Setting detail: THERAPIES SERIES
Discharge: HOME | End: 2024-07-23
Payer: COMMERCIAL

## 2024-07-23 PROCEDURE — 97110 THERAPEUTIC EXERCISES: CPT | Performed by: OCCUPATIONAL THERAPIST

## 2024-07-23 PROCEDURE — 20560 NDL INSJ W/O NJX 1 OR 2 MUSC: CPT | Performed by: OCCUPATIONAL THERAPIST

## 2024-07-23 PROCEDURE — 97140 MANUAL THERAPY 1/> REGIONS: CPT | Performed by: OCCUPATIONAL THERAPIST

## 2024-07-23 PROCEDURE — 97035 APP MDLTY 1+ULTRASOUND EA 15: CPT | Performed by: OCCUPATIONAL THERAPIST

## 2024-07-23 NOTE — FLOWSHEET NOTE
[facility]- Outpatient Rehabilitation and Therapy [address], [place] [address] office: [phone] fax: [phone]     Occupational Therapy Initial Evaluation Certification      Dear Nabeel Blanchard MD,    We had the pleasure of evaluating the following patient for occupational therapy services at [facility] Out[facility] Occupational Therapy.  A summary of our findings can be found in the initial assessment below.  This includes our plan of care.  If you have any questions or concerns regarding these findings, please do not hesitate to contact me at the office phone number listed above.  Thank you for the referral.     Physician Signature:_______________________________Date:__________________  By signing above (or electronic signature), therapist’s plan is approved by physician       Occupational Therapy: TREATMENT/PROGRESS NOTE   Patient: Lori Espinosa (49 y.o. female)   Examination Date: 2024   :  1975 MRN: [id number]   Visit #: 8  Insurance Allowable Auth Needed    [facility]  until 24 [x]Yes    []No    Insurance: Payor: PMA / Plan: PMA WC / Product Type: *No Product type* /   Insurance ID: [id number] - (Worker's Comp)  Secondary Insurance (if applicable):    Treatment Diagnosis: L elbow pain M25.522  No diagnosis found.   Medical Diagnosis:  Strain of left ankle, initial encounter [S96.912A]  Sprain of anterior talofibular ligament of left ankle, initial encounter [S93.492A]   Referring Physician: Nabeel Blanchard MD  PCP: Rom Alves MD     DOI:[id number]    DOS:N/A    Plan of care signed (Y/N):     Date of Patient follow up with Physician:      Nahomi Date:   Progress Report/POC: EVAL today  POC update due: (10 visits /OR AUTH LIMITS, whichever is less)  2024                                             Precautions/ Contra-indications:           Latex allergy:  NO  Pacemaker:    NO  Contraindications for Manipulation:

## 2024-07-31 ENCOUNTER — OFFICE VISIT (OUTPATIENT)
Dept: ORTHOPEDIC SURGERY | Age: 49
End: 2024-07-31

## 2024-07-31 ENCOUNTER — HOSPITAL ENCOUNTER (OUTPATIENT)
Dept: OCCUPATIONAL THERAPY | Age: 49
Setting detail: THERAPIES SERIES
Discharge: HOME | End: 2024-07-31
Payer: COMMERCIAL

## 2024-07-31 ENCOUNTER — TELEPHONE (OUTPATIENT)
Dept: ORTHOPEDIC SURGERY | Age: 49
End: 2024-07-31

## 2024-07-31 VITALS — BODY MASS INDEX: 35.79 KG/M2 | HEIGHT: 67 IN | WEIGHT: 228 LBS

## 2024-07-31 DIAGNOSIS — S93.492A SPRAIN OF ANTERIOR TALOFIBULAR LIGAMENT OF LEFT ANKLE, INITIAL ENCOUNTER: Primary | ICD-10-CM

## 2024-07-31 PROCEDURE — 20560 NDL INSJ W/O NJX 1 OR 2 MUSC: CPT | Performed by: OCCUPATIONAL THERAPIST

## 2024-07-31 PROCEDURE — 97140 MANUAL THERAPY 1/> REGIONS: CPT | Performed by: OCCUPATIONAL THERAPIST

## 2024-07-31 PROCEDURE — 97035 APP MDLTY 1+ULTRASOUND EA 15: CPT | Performed by: OCCUPATIONAL THERAPIST

## 2024-07-31 NOTE — FLOWSHEET NOTE
[facility]- Outpatient Rehabilitation and Therapy [address], [place] [address] office: [phone] fax: [phone]     Occupational Therapy Initial Evaluation Certification      Dear Nabeel Blanchard MD,    We had the pleasure of evaluating the following patient for occupational therapy services at [facility] Outpatient Occupational Therapy.  A summary of our findings can be found in the initial assessment below.  This includes our plan of care.  If you have any questions or concerns regarding these findings, please do not hesitate to contact me at the office phone number listed above.  Thank you for the referral.     Physician Signature:_______________________________Date:__________________  By signing above (or electronic signature), therapist’s plan is approved by physician       Occupational Therapy: TREATMENT/PROGRESS NOTE   Patient: Lori Espinosa (49 y.o. female)   Examination Date: 2024   :  1975 MRN: [id number]   Visit #: 9  Insurance Allowable Auth Needed    [facility]  until 24 [x]Yes    []No    Insurance: Payor: PMA / Plan: PMA WC / Product Type: *No Product type* /   Insurance ID: [id number] - (Worker's Comp)  Secondary Insurance (if applicable):    Treatment Diagnosis: L elbow pain M25.522  No diagnosis found.   Medical Diagnosis:  Strain of left ankle, initial encounter [S96.912A]  Sprain of anterior talofibular ligament of left ankle, initial encounter [S93.492A]   Referring Physician: Nabeel Blanchard MD  PCP: Rom Alves MD     DOI:[id number]    DOS:N/A    Plan of care signed (Y/N):     Date of Patient follow up with Physician:      Nahomi Date:   Progress Report/POC: EVAL today  POC update due: (10 visits /OR AUTH LIMITS, whichever is less)  2024                                             Precautions/ Contra-indications:           Latex allergy:  NO  Pacemaker:    NO  Contraindications for Manipulation:

## 2024-07-31 NOTE — PROGRESS NOTES
CHIEF COMPLAINT: Left ankle pain/ Ankle sprain.    DATE OF INJURY: 1/18/24    History:  Ms. Espinosa is a 49 y.o. female presents today for the first visit for evaluation of a left ankle injury which occurred when she fell and rolled her ankle.    She is complaining of lateral ankle pain. She rates pain 2/10.   Pain increases with walking and stairs.  She states that she was seen by somebody at work and then referred to physical therapy.  No x-rays were obtained initially.  She had 3 sessions of physical therapy in  [place], [place].  She has not really been consistent with home exercise program.  Pain improves with compression sleeve.  She has had no relief using ice.  She does not take NSAIDs  No numbness or tingling sensation.  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    Interval History: She went to 6 sessions of physical therapy at the [facility].  She last went approxi-1 month ago because she went on vacation and her therapist went on paternity leave. She rates pain 4/10. She reports continued swelling and pain when ambulating.         Past Medical History:   Diagnosis Date    Depression     Insomnia     RY on CPAP        Past Surgical History:   Procedure Laterality Date    COLONOSCOPY      ENDOMETRIAL BIOPSY      LEG BIOPSY EXCISION Right 7/18/2023    EXCISION OF RIGHT THIGH LIPOMA performed by Alex Meza MD at [facility] OR    LIPOMA RESECTION Right 07/18/2023    EXCISION OF RIGHT THIGH LIPOMA - Right    SHOULDER SURGERY Right 11/29/2016       Current Outpatient Medications on File Prior to Visit   Medication Sig Dispense Refill    venlafaxine (EFFEXOR XR) 75 MG extended release capsule TAKE 1 CAPSULE BY MOUTH EVERY DAY WITH FOOD      Loratadine-Pseudoephedrine (CLARITIN-D 12 HOUR PO) Take by mouth      fluticasone (FLONASE) 50 MCG/ACT nasal spray 1 spray by Each Nostril route daily      levonorgestrel (MIRENA, 52 MG,) IUD 52 mg 1 Units by IntraUTERine route       No current

## 2024-08-02 ENCOUNTER — TELEMEDICINE (OUTPATIENT)
Dept: PULMONOLOGY | Age: 49
End: 2024-08-02
Payer: COMMERCIAL

## 2024-08-02 ENCOUNTER — TELEPHONE (OUTPATIENT)
Dept: PULMONOLOGY | Age: 49
End: 2024-08-02

## 2024-08-02 DIAGNOSIS — R53.83 OTHER FATIGUE: ICD-10-CM

## 2024-08-02 DIAGNOSIS — G47.33 MILD OBSTRUCTIVE SLEEP APNEA: Primary | ICD-10-CM

## 2024-08-02 DIAGNOSIS — E66.9 OBESITY (BMI 30-39.9): ICD-10-CM

## 2024-08-02 DIAGNOSIS — F51.04 PSYCHOPHYSIOLOGICAL INSOMNIA: ICD-10-CM

## 2024-08-02 DIAGNOSIS — G47.10 HYPERSOMNIA: ICD-10-CM

## 2024-08-02 PROCEDURE — 99214 OFFICE O/P EST MOD 30 MIN: CPT | Performed by: NURSE PRACTITIONER

## 2024-08-02 RX ORDER — CITRULL/ARGIN/PINE XT/ROSE HIP 400-400 MG
TABLET ORAL
COMMUNITY

## 2024-08-02 ASSESSMENT — SLEEP AND FATIGUE QUESTIONNAIRES
HOW LIKELY ARE YOU TO NOD OFF OR FALL ASLEEP WHILE SITTING AND READING: SLIGHT CHANCE OF DOZING
HOW LIKELY ARE YOU TO NOD OFF OR FALL ASLEEP WHILE SITTING INACTIVE IN A PUBLIC PLACE: WOULD NEVER DOZE
HOW LIKELY ARE YOU TO NOD OFF OR FALL ASLEEP WHILE SITTING QUIETLY AFTER LUNCH WITHOUT ALCOHOL: SLIGHT CHANCE OF DOZING
ESS TOTAL SCORE: 9
HOW LIKELY ARE YOU TO NOD OFF OR FALL ASLEEP WHILE LYING DOWN TO REST IN THE AFTERNOON WHEN CIRCUMSTANCES PERMIT: MODERATE CHANCE OF DOZING
HOW LIKELY ARE YOU TO NOD OFF OR FALL ASLEEP WHEN YOU ARE A PASSENGER IN A CAR FOR AN HOUR WITHOUT A BREAK: HIGH CHANCE OF DOZING
HOW LIKELY ARE YOU TO NOD OFF OR FALL ASLEEP IN A CAR, WHILE STOPPED FOR A FEW MINUTES IN TRAFFIC: SLIGHT CHANCE OF DOZING
HOW LIKELY ARE YOU TO NOD OFF OR FALL ASLEEP WHILE SITTING AND TALKING TO SOMEONE: WOULD NEVER DOZE
HOW LIKELY ARE YOU TO NOD OFF OR FALL ASLEEP WHILE WATCHING TV: SLIGHT CHANCE OF DOZING

## 2024-08-02 NOTE — TELEPHONE ENCOUNTER
Faxed nasal mask order, CR, and ov notes to [facility] at [phone] via [employer]ax.    Routed bipap titration to Daysi Camilo

## 2024-08-02 NOTE — PROGRESS NOTES
Patient ID: Lori Espinosa is a 49 y.o. female who is being seen today for   Chief Complaint   Patient presents with    Follow-up     Sleep       Referring: Dr. Rom Hurd    HPI:   Lori Espinosa is a 49 y.o. female for televideo appointment via video and audio virtual visit for RY follow up.  States she is doing okay with CPAP.  She continues with fatigue and tiredness through the day and has trouble maintaining sleep at night.  Patient is using CPAP  8 hrs/night. Using humidifier. No snoring on CPAP. The pressure is well tolerated. The mask is comfortable- nasal mask. No mask leak. +daytime sleepiness. Denies nodding off when driving, but can get sleepy at times. No dry nose or throat. +fatigue. Bedtime is 1030 pm and rise time is 630 am. Sleep onset is few minutes. Wakes up 5-8 times at night total. No nocturia. It takes few-20 minutes to fall back a sleep. No naps during the day. Rare headache in am. No weight gain. 2 caffienated beverages during the day. Rare alcohol. ESS is 9    Previous HPI   Lori Espinosa is a 49 y.o. female for televideo appointment via video and audio virtual visit for RY follow up.  States she is doing pretty well with CPAP .  States she is sleeping better but is working more.  Patient is using CPAP  8 hrs/night. Using humidifier. No snoring on CPAP. The pressure is well tolerated. The mask is comfortable- nasal mask. Some mask leak. No significant daytime sleepiness. Denies nodding off when driving. No dry nose or throat. Some fatigue-more with increased work hours recently. Bedtime is 1030 pm and rise time is 6 am. Sleep onset is few minutes. Wakes up 2-3 times at night total. No nocturia. It takes few minutes to fall back a sleep. No naps during the day. No headache in am. No weight gain. 1-2 caffienated beverages during the day. Occasional alcohol. ESS is 10    States she did get repeat ferritin level however got it at [facility] so I do not have the results    Previous

## 2024-08-02 NOTE — PATIENT INSTRUCTIONS
Absolutely no driving if sleepy.  It is your responsibility not to drive if fatigued, tired, or sleepy       Here are some tips to to getting better sleep  1- Avoid napping during the day: This will ensure you are tired at bedtime. If you have to take a nap, sleep less than one hour, before 3 pm.   2- Exercise regularly, but not right before bed: but the timing of the workout is important. Exercising in the morning or early afternoon will not interfere with sleep.  Exercising within two hours before bedtime can decrease your ability to fall asleep.  Regular exercise is recommended to help you deepen the sleep.   3- Avoid heavy, spicy, or sugary foods 4-6 hours before bedtime: These can affect your ability to stay asleep.  4- Have a light snack before bed: Having an empty stomach can interfere with your sleep. Dairy products and turkey contain tryptophan, which acts as a natural sleep inducer.   5- Stay away from caffeine, nicotine and alcohol at least 4-6 hours before bed: Caffeine and nicotine are stimulants that interfere with your ability to fall asleep. While alcohol has an immediate sleep-inducing effect, a few hours later, as alcohol levels in your blood start to fall, there is a stimulant effect and you will experience fragmented sleep.   6- Take a hot bath 90 minutes before bedtime:  A hot bath will raise your body temperature, but it is the drop in body temperature that may leave you feeling sleepy  7- Develop sleep rituals: it is important to give your body cues that it is time to slow down and sleep. Listen to relaxing music, read something soothing for 15 minutes, have a cup of caffeine free tea, or do relaxation exercises such as yoga or deep breathing help relieve anxiety and reduce muscle tension.   8- Fix a bedtime and an awakening time: Even on weekends! When your sleep cycle has a regular rhythm, you will feel better.   9- Sleep only when sleepy: This reduces the time you are awake in bed.   10-

## 2024-08-26 ENCOUNTER — TELEPHONE (OUTPATIENT)
Dept: ORTHOPEDIC SURGERY | Age: 49
End: 2024-08-26

## 2024-08-26 NOTE — TELEPHONE ENCOUNTER
We received C9 approval for MRI.      Spoke with patient. She requests that MRI and approved C9 be faxed to [facility].    Faxed as requested

## 2024-09-09 ENCOUNTER — HOSPITAL ENCOUNTER (OUTPATIENT)
Dept: SLEEP CENTER | Age: 49
Discharge: HOME | End: 2024-09-11
Payer: COMMERCIAL

## 2024-09-09 ENCOUNTER — TELEPHONE (OUTPATIENT)
Dept: ORTHOPEDIC SURGERY | Age: 49
End: 2024-09-09

## 2024-09-09 DIAGNOSIS — G47.33 MILD OBSTRUCTIVE SLEEP APNEA: ICD-10-CM

## 2024-09-09 DIAGNOSIS — E66.9 OBESITY (BMI 30-39.9): ICD-10-CM

## 2024-09-09 DIAGNOSIS — G47.10 HYPERSOMNIA: ICD-10-CM

## 2024-09-09 DIAGNOSIS — S93.492A SPRAIN OF ANTERIOR TALOFIBULAR LIGAMENT OF LEFT ANKLE, INITIAL ENCOUNTER: Primary | ICD-10-CM

## 2024-09-09 DIAGNOSIS — R53.83 OTHER FATIGUE: ICD-10-CM

## 2024-09-09 DIAGNOSIS — F51.04 PSYCHOPHYSIOLOGICAL INSOMNIA: ICD-10-CM

## 2024-09-09 PROCEDURE — 95811 POLYSOM 6/>YRS CPAP 4/> PARM: CPT

## 2024-09-10 ENCOUNTER — TELEPHONE (OUTPATIENT)
Dept: PULMONOLOGY | Age: 49
End: 2024-09-10

## 2024-09-10 DIAGNOSIS — F51.04 PSYCHOPHYSIOLOGICAL INSOMNIA: ICD-10-CM

## 2024-09-10 DIAGNOSIS — G47.10 HYPERSOMNIA: ICD-10-CM

## 2024-09-10 DIAGNOSIS — G47.33 MILD OBSTRUCTIVE SLEEP APNEA: Primary | ICD-10-CM

## 2024-09-10 PROCEDURE — 95811 POLYSOM 6/>YRS CPAP 4/> PARM: CPT | Performed by: INTERNAL MEDICINE

## 2024-09-30 ENCOUNTER — TELEPHONE (OUTPATIENT)
Dept: ORTHOPEDIC SURGERY | Age: 49
End: 2024-09-30

## 2024-09-30 NOTE — TELEPHONE ENCOUNTER
Spoke with patient. Informed of  approval for referral to Dr Walker. Patient will call back to schedule.

## 2024-10-03 ENCOUNTER — TELEPHONE (OUTPATIENT)
Dept: PULMONOLOGY | Age: 49
End: 2024-10-03

## 2024-10-03 NOTE — TELEPHONE ENCOUNTER
LVM returning pt call from voicemail To reschedule appt from 10/18/24.   Pt has not received machine

## 2024-10-04 ENCOUNTER — TELEPHONE (OUTPATIENT)
Dept: PULMONOLOGY | Age: 49
End: 2024-10-04

## 2024-10-04 NOTE — TELEPHONE ENCOUNTER
Patient called in wanting to know why her machine has not arrived and she wanted to know why the sleep study was not covered and not told beforehand. I gave the patient the numbers to [facility] and to Sleep lab. I was going to give her the billing number but she had just hung up with them. They told her to call us to find out why things are not covered or sent.  She will call them.

## 2024-10-04 NOTE — TELEPHONE ENCOUNTER
Patient called in wanting to know why her machine has not arrived and she wanted to know why the sleep study was not covered and not told beforehand. I gave the patient the numbers to [facility] and to Sleep lab. I was going to give her the billing number but she had just hung up with them. They told her to call us to find out why things are not covered or sent.  She will call them.         Pt called back after talking to [facility] and the Sleep lab, [facility] is stating they need F2F notes after 9/9/24. Sleep lab told patient to call insurance since the study was covered and that is the coinsurance.    I called [facility] to find out what exactly they needed from us. I explained that the patient has not had the F2F office visit or the 31-90 day visit with her new machine since she has not had the bipap machine yet. She had the titration study for the bipap and it showed controlled on a certain pressure. Once patient gets the bipap machine then we can schedule her 31-90 day on the bipap machine.  [facility] will call me back to talk further about this.

## 2024-10-24 ENCOUNTER — OFFICE VISIT (OUTPATIENT)
Dept: ORTHOPEDIC SURGERY | Age: 49
End: 2024-10-24

## 2024-10-24 VITALS — BODY MASS INDEX: 35.78 KG/M2 | WEIGHT: 227.96 LBS | HEIGHT: 67 IN

## 2024-10-24 DIAGNOSIS — M25.572 ACUTE LEFT ANKLE PAIN: Primary | ICD-10-CM

## 2024-10-24 DIAGNOSIS — S93.402A SPRAIN OF LEFT ANKLE, UNSPECIFIED LIGAMENT, INITIAL ENCOUNTER: ICD-10-CM

## 2024-10-24 NOTE — PROGRESS NOTES
syndesmotic ligament, without evidence of tear or syndesmosis widening.   3. Mild swelling of the deltoid ligament, without tear.   4. Severe volumetric muscle atrophy and fatty infiltration of the abductor digiti minimi muscle; question Grant's neuropathy versus atrophy due to altered mechanics of weight-bearing.   5. Small to moderate-sized ankle and subtalar joint effusion.   6. The remaining ligament and tendon structures are intact. No acute osseous injury.     IMPRESSION: Left ankle ATF sprain.    PLAN:  I assured the patient that the xray and MRI are negative for acute fracture. The MRI findings were reviewed with the patient and explained to her that the pain is 2ry to ankle sprain, and that it should continue to improve.  She can be WBAT, and avoid heavy impact acitivity and work on peroneal muscle strength. I discussed with the patient that I think that she would really benefit from a course of physical therapy for further strengthening and stretching. An Rx for physical therapy was given to the patient. Naprosyn, Rx sent. F/U in 6 weeks, more PT if needed.       Chloe Walker MD

## 2024-11-07 ENCOUNTER — TELEPHONE (OUTPATIENT)
Dept: ORTHOPEDIC SURGERY | Age: 49
End: 2024-11-07

## 2024-11-15 ENCOUNTER — HOSPITAL ENCOUNTER (OUTPATIENT)
Dept: PHYSICAL THERAPY | Age: 49
Setting detail: THERAPIES SERIES
Discharge: HOME | End: 2024-11-15
Payer: COMMERCIAL

## 2024-11-15 DIAGNOSIS — M62.81 MUSCLE WEAKNESS: ICD-10-CM

## 2024-11-15 DIAGNOSIS — M25.672 ANKLE STIFFNESS, LEFT: ICD-10-CM

## 2024-11-15 DIAGNOSIS — M25.572 LEFT ANKLE PAIN, UNSPECIFIED CHRONICITY: Primary | ICD-10-CM

## 2024-11-15 PROCEDURE — 97161 PT EVAL LOW COMPLEX 20 MIN: CPT

## 2024-11-15 PROCEDURE — 97112 NEUROMUSCULAR REEDUCATION: CPT

## 2024-11-15 PROCEDURE — 97140 MANUAL THERAPY 1/> REGIONS: CPT

## 2024-11-15 PROCEDURE — 97110 THERAPEUTIC EXERCISES: CPT

## 2024-11-15 NOTE — PLAN OF CARE
listed.  [] Progression has been slowed due to co-morbidities.  [x] Plan just implemented, too soon (<30days) to assess goals progression   [] Goals require adjustment due to lack of progress  [] Patient is not progressing as expected and requires additional follow up with physician  [] Other:     TREATMENT PLAN     Frequency/Duration: 1-2x/week for 4-6 weeks for the following treatment interventions:    Interventions:  Therapeutic Exercise (18867) including: strength training, ROM, and functional mobility  Therapeutic Activities (15590) including: functional mobility training and education.  Neuromuscular Re-education (23233) activation and proprioception, including postural re-education.    Gait Training (04562) for normalization of ambulation patterns and AD training.   Manual Therapy (87207) as indicated to include: Passive Range of Motion, Gr I-IV mobilizations, Soft Tissue Mobilization, Dry Needling/IASTM, Trigger Point Release, and Myofascial Release  Modalities as needed that may include: Cryotherapy, Electrical Stimulation, and Vasoneumatic Compression  Patient education on joint protection, postural re-education, activity modification, and progression of HEP    Plan: POC initiated as per evaluation    Electronically Signed by Vandana Patricia PT  Date: 11/15/2024     Note: Portions of this note have been templated and/or copied from initial evaluation, reassessments and prior notes for documentation efficiency.    Note: If patient does not return for scheduled/recommended follow up visits, this note will serve as a discharge from care along with the most recent update on progress.    Ortho Evaluation

## 2024-11-18 ENCOUNTER — HOSPITAL ENCOUNTER (OUTPATIENT)
Dept: PHYSICAL THERAPY | Age: 49
Setting detail: THERAPIES SERIES
Discharge: HOME | End: 2024-11-18
Payer: COMMERCIAL

## 2024-11-18 PROCEDURE — 97140 MANUAL THERAPY 1/> REGIONS: CPT

## 2024-11-18 PROCEDURE — 97110 THERAPEUTIC EXERCISES: CPT

## 2024-11-18 NOTE — FLOWSHEET NOTE
[facility]- Outpatient Rehabilitation and Therapy [address] [place] [address], [address], [place] [address] office: [phone] fax: [phone]         Physical Therapy: TREATMENT/PROGRESS NOTE   Patient: Lori Espinosa (49 y.o. female)   Examination Date: 2024   :  1975 MRN: [id number]   Visit #: 2   Insurance Allowable Auth Needed   MN [x]Yes    []No        Insurance: Payor: PMA / Plan: PMA WC / Product Type: *No Product type* /   Insurance ID: [id number] - (Worker's Comp)  Secondary Insurance (if applicable):    Treatment Diagnosis: Left ankle pain, mobility and strength deficits.  No diagnosis found.     Medical Diagnosis:  Sprain of left ankle, unspecified ligament, initial encounter [S93.402A]   Referring Physician: Chloe Walker MD  PCP: Rom Alves MD     Plan of care signed (Y/N):     Date of Patient follow up with Physician:      Plan of Care Report: NO   POC update due: (10 visits /OR AUTH LIMITS, whichever is less)   2024                                             Medical History:  Comorbidities:  Depression  Relevant Medical History:   Past Medical History:   Diagnosis Date    Depression     Insomnia     RY on CPAP                                               Precautions/ Contra-indications:           Latex allergy:  NO  Pacemaker:    NO  Contraindications for Manipulation: blood thinners (relative)  Date of Surgery:    Other:    Red Flags:  None    Suicide Screening:   The patient did not verbalize a primary behavioral concern, suicidal ideation, suicidal intent, or demonstrate suicidal behaviors.    Preferred Language for Healthcare:   [x] English       [] other:    SUBJECTIVE EXAMINATION     Patient stated complaint: Patient reports pain is 2/10 today.  No issues with the HEP.  Taping was helpful.      11/15/24: The patient is being seen in physical therapy for left ankle sprain from 2024.  She was sent to therapy and had MRI and returned to therapy to

## 2024-11-25 ENCOUNTER — HOSPITAL ENCOUNTER (OUTPATIENT)
Dept: PHYSICAL THERAPY | Age: 49
Setting detail: THERAPIES SERIES
Discharge: HOME | End: 2024-11-25
Payer: COMMERCIAL

## 2024-11-25 PROCEDURE — 97110 THERAPEUTIC EXERCISES: CPT

## 2024-11-25 PROCEDURE — 97112 NEUROMUSCULAR REEDUCATION: CPT

## 2024-11-25 PROCEDURE — 97140 MANUAL THERAPY 1/> REGIONS: CPT

## 2024-11-25 NOTE — FLOWSHEET NOTE
[facility]- Outpatient Rehabilitation and Therapy [address] [place] [address], [address], [place] [address] office: [phone] fax: [phone]         Physical Therapy: TREATMENT/PROGRESS NOTE   Patient: Lori Espinosa (49 y.o. female)   Examination Date: 2024   :  1975 MRN: [id number]   Visit #: 3   Insurance Allowable Auth Needed   MN [x]Yes    []No        Insurance: Payor: PMA / Plan: PMA WC / Product Type: *No Product type* /   Insurance ID: [id number] - (Worker's Comp)  Secondary Insurance (if applicable):    Treatment Diagnosis: Left ankle pain, mobility and strength deficits.  No diagnosis found.     Medical Diagnosis:  Sprain of left ankle, unspecified ligament, initial encounter [S93.402A]   Referring Physician: Chloe Walker MD  PCP: Rom Alves MD     Plan of care signed (Y/N):     Date of Patient follow up with Physician:      Plan of Care Report: NO   POC update due: (10 visits /OR AUTH LIMITS, whichever is less)   2024                                             Medical History:  Comorbidities:  Depression  Relevant Medical History:   Past Medical History:   Diagnosis Date    Depression     Insomnia     RY on CPAP                                               Precautions/ Contra-indications:           Latex allergy:  NO  Pacemaker:    NO  Contraindications for Manipulation: blood thinners (relative)  Date of Surgery:    Other:    Red Flags:  None    Suicide Screening:   The patient did not verbalize a primary behavioral concern, suicidal ideation, suicidal intent, or demonstrate suicidal behaviors.    Preferred Language for Healthcare:   [x] English       [] other:    SUBJECTIVE EXAMINATION     Patient stated complaint: Patient reports doing better today.  Increased soreness after last visit. Not as much swelling present today.      11/15/24: The patient is being seen in physical therapy for left ankle sprain from 2024.  She was sent to therapy and had MRI and

## 2024-11-27 ENCOUNTER — HOSPITAL ENCOUNTER (OUTPATIENT)
Dept: PHYSICAL THERAPY | Age: 49
Setting detail: THERAPIES SERIES
Discharge: HOME | End: 2024-11-27
Payer: COMMERCIAL

## 2024-11-27 PROCEDURE — 97112 NEUROMUSCULAR REEDUCATION: CPT

## 2024-11-27 PROCEDURE — 97110 THERAPEUTIC EXERCISES: CPT

## 2024-11-27 PROCEDURE — 97140 MANUAL THERAPY 1/> REGIONS: CPT

## 2024-11-27 NOTE — FLOWSHEET NOTE
improve functional performance. (Ex include squatting, ascending/descending stairs, walking, bending, lifting, catching, throwing, pushing, pulling, jumping.)  Direct, one on one contact, billed in 15-minute increments.  (81015) MANUAL THERAPY -  Manual therapy techniques, 1 or more regions, each 15 minutes (Mobilization/manipulation, manual lymphatic drainage, manual traction) for the purpose of modulating pain, promoting relaxation,  increasing ROM, reducing/eliminating soft tissue swelling/inflammation/restriction, improving soft tissue extensibility and allowing for proper ROM for normal function with self care, mobility, lifting and ambulation  (25110) GAIT RE-EDUCATION - Provided training and instruction to the patient for proper joint and muscle recruitment and positioning and eccentric body weight control with ambulation re-education including up and down stairs. Therapeutic procedure, one or more areas, each 15 minutes;   (50511) VASOPNEUMATIC  (36898) UNATTENDED ESTIM. Electrical stimulation (unattended), to 1 or more areas for indication(s) other than wound care, as part of a therapy plan of care. ([facility] )    GOALS     Patient stated goal: To be able to walk and stand without limitations for daily household and occupational activities.   [] Progressing: [] Met: [] Not Met: [] Adjusted    Therapist goals for Patient:   Short Term Goals: To be achieved in: 2 weeks  1. Independent in HEP and progression per patient tolerance, in order to prevent re-injury.   [] Progressing: [] Met: [] Not Met: [] Adjusted  2. Patient will have a decrease in pain to <0/10 to facilitate improvement in movement, function, and ADLs as indicated by Functional Deficits.  [] Progressing: [] Met: [] Not Met: [] Adjusted    IF APPLICABLE:  [] Patient to demonstrate independence in wear and care for custom orthotic device. (Only if applicable for orthotic eval)     Long Term Goals: To be achieved in: 4-6 weeks  1. Disability

## 2024-12-02 ENCOUNTER — HOSPITAL ENCOUNTER (OUTPATIENT)
Dept: PHYSICAL THERAPY | Age: 49
Setting detail: THERAPIES SERIES
Discharge: HOME | End: 2024-12-02
Payer: COMMERCIAL

## 2024-12-02 PROCEDURE — 97140 MANUAL THERAPY 1/> REGIONS: CPT

## 2024-12-02 PROCEDURE — 97110 THERAPEUTIC EXERCISES: CPT

## 2024-12-02 PROCEDURE — 97112 NEUROMUSCULAR REEDUCATION: CPT

## 2024-12-02 NOTE — FLOWSHEET NOTE
just implemented, too soon (<30days) to assess goals progression   [] Goals require adjustment due to lack of progress  [] Patient is not progressing as expected and requires additional follow up with physician  [] Other:     TREATMENT PLAN     Frequency/Duration: 1-2x/week for 4-6 weeks for the following treatment interventions:    Interventions:  Therapeutic Exercise (48311) including: strength training, ROM, and functional mobility  Therapeutic Activities (71411) including: functional mobility training and education.  Neuromuscular Re-education (03548) activation and proprioception, including postural re-education.    Gait Training (84489) for normalization of ambulation patterns and AD training.   Manual Therapy (68213) as indicated to include: Passive Range of Motion, Gr I-IV mobilizations, Soft Tissue Mobilization, Dry Needling/IASTM, Trigger Point Release, and Myofascial Release  Modalities as needed that may include: Cryotherapy, Electrical Stimulation, and Vasoneumatic Compression  Patient education on joint protection, postural re-education, activity modification, and progression of HEP    Plan:  Continue with current PT POC.    Electronically Signed by DOLORES MENCHACA [id number]                                  Date: 12/02/2024     Note: Portions of this note have been templated and/or copied from initial evaluation, reassessments and prior notes for documentation efficiency.    Note: If patient does not return for scheduled/recommended follow up visits, this note will serve as a discharge from care along with the most recent update on progress.

## 2024-12-05 ENCOUNTER — HOSPITAL ENCOUNTER (OUTPATIENT)
Dept: PHYSICAL THERAPY | Age: 49
Setting detail: THERAPIES SERIES
Discharge: HOME | End: 2024-12-05
Payer: COMMERCIAL

## 2024-12-05 DIAGNOSIS — M25.572 LEFT ANKLE PAIN, UNSPECIFIED CHRONICITY: Primary | ICD-10-CM

## 2024-12-05 DIAGNOSIS — M25.672 ANKLE STIFFNESS, LEFT: ICD-10-CM

## 2024-12-05 PROCEDURE — 97112 NEUROMUSCULAR REEDUCATION: CPT

## 2024-12-05 PROCEDURE — 97140 MANUAL THERAPY 1/> REGIONS: CPT

## 2024-12-05 NOTE — FLOWSHEET NOTE
[facility]- Outpatient Rehabilitation and Therapy [facility], [address], [place] [address] office: [phone] fax: [phone]         Physical Therapy: TREATMENT/PROGRESS NOTE   Patient: Lori Espinosa (49 y.o. female)   Examination Date: 2024   :  1975 MRN: [id number]   Visit #: 18 by 25  Insurance Allowable Auth Needed   3x4-6wks by 25 [x]Yes    []No        Insurance: Payor: PMA / Plan: PMA WC / Product Type: *No Product type* /   Insurance ID: [id number] - (Worker's Comp)  Secondary Insurance (if applicable):    Treatment Diagnosis: Left ankle pain, mobility and strength deficits.    ICD-10-CM    1. Left ankle pain, unspecified chronicity  M25.572       2. Ankle stiffness, left  M25.672            Medical Diagnosis:  Sprain of left ankle, unspecified ligament, initial encounter [S93.402A]   Referring Physician: Chloe Walker MD  PCP: Rom Alves MD     Plan of care signed (Y/N): Y eval    Date of Patient follow up with Physician: ?     Plan of Care Report: NO   POC update due: (10 visits /OR AUTH LIMITS, whichever is less)   2024                                             Medical History:  Comorbidities:  Depression  Relevant Medical History:   Past Medical History:   Diagnosis Date    Depression     Insomnia     RY on CPAP                                               Precautions/ Contra-indications:           Latex allergy:  NO  Pacemaker:    NO  Contraindications for Manipulation: blood thinners (relative)  Date of Surgery:    Other:    Red Flags:  None    Suicide Screening:   The patient did not verbalize a primary behavioral concern, suicidal ideation, suicidal intent, or demonstrate suicidal behaviors.    Preferred Language for Healthcare:   [x] English       [] other:    SUBJECTIVE EXAMINATION     Patient stated complaint:  Patient reports doing ok today.  Some increased pain and swelling after black Friday shopping but is improving.

## 2024-12-06 ENCOUNTER — TELEPHONE (OUTPATIENT)
Dept: PULMONOLOGY | Age: 49
End: 2024-12-06

## 2024-12-06 ENCOUNTER — TELEMEDICINE (OUTPATIENT)
Dept: PULMONOLOGY | Age: 49
End: 2024-12-06
Payer: COMMERCIAL

## 2024-12-06 DIAGNOSIS — G47.33 MILD OBSTRUCTIVE SLEEP APNEA: Primary | ICD-10-CM

## 2024-12-06 DIAGNOSIS — E66.9 OBESITY (BMI 30-39.9): ICD-10-CM

## 2024-12-06 DIAGNOSIS — G47.09 OTHER INSOMNIA: ICD-10-CM

## 2024-12-06 DIAGNOSIS — G47.10 HYPERSOMNIA: ICD-10-CM

## 2024-12-06 DIAGNOSIS — Z71.89 ENCOUNTER FOR BIPAP USE COUNSELING: ICD-10-CM

## 2024-12-06 PROCEDURE — 99214 OFFICE O/P EST MOD 30 MIN: CPT | Performed by: NURSE PRACTITIONER

## 2024-12-06 ASSESSMENT — SLEEP AND FATIGUE QUESTIONNAIRES
ESS TOTAL SCORE: 11
HOW LIKELY ARE YOU TO NOD OFF OR FALL ASLEEP WHEN YOU ARE A PASSENGER IN A CAR FOR AN HOUR WITHOUT A BREAK: HIGH CHANCE OF DOZING
HOW LIKELY ARE YOU TO NOD OFF OR FALL ASLEEP WHILE WATCHING TV: MODERATE CHANCE OF DOZING
HOW LIKELY ARE YOU TO NOD OFF OR FALL ASLEEP WHILE SITTING INACTIVE IN A PUBLIC PLACE: SLIGHT CHANCE OF DOZING
HOW LIKELY ARE YOU TO NOD OFF OR FALL ASLEEP WHILE SITTING QUIETLY AFTER LUNCH WITHOUT ALCOHOL: SLIGHT CHANCE OF DOZING
HOW LIKELY ARE YOU TO NOD OFF OR FALL ASLEEP WHILE SITTING AND READING: SLIGHT CHANCE OF DOZING
HOW LIKELY ARE YOU TO NOD OFF OR FALL ASLEEP IN A CAR, WHILE STOPPED FOR A FEW MINUTES IN TRAFFIC: WOULD NEVER DOZE
HOW LIKELY ARE YOU TO NOD OFF OR FALL ASLEEP WHILE LYING DOWN TO REST IN THE AFTERNOON WHEN CIRCUMSTANCES PERMIT: MODERATE CHANCE OF DOZING
HOW LIKELY ARE YOU TO NOD OFF OR FALL ASLEEP WHILE SITTING AND TALKING TO SOMEONE: SLIGHT CHANCE OF DOZING

## 2024-12-06 NOTE — TELEPHONE ENCOUNTER
Faxed [phone] note to Trista [facility] via [employer]fax.    Adequate: hears normal conversation without difficulty

## 2024-12-06 NOTE — PROGRESS NOTES
mask leak. +daytime sleepiness. States went to PCP and states did not really find other causes of fatigue.  Denies nodding off when driving. No dry nose or throat. No fatigue. Bedtime is  pm and rise time is 530-6 am. Sleep onset is usually few minutes. Wakes up 4-5 times at night total, feels restless. 0-1 nocturia. It takes usually few minutes to fall back a sleep. No naps during the day. No headache in am. No weight gain. 2 caffienated beverages during the day. Occassioanl alcohol. ESS is 10.    Patient still having daytime sleepiness despite adequate treatment of sleep apnea.  She does state has some restlessness at night and aching in legs.  She denies cataplexy.  She denies sleep paralysis.  She denies hallucinations when falling or waking from sleep.  States she has also discussed other causes of fatigue with PCP.          Previous HPI 3/14/22  Lori Espinosa is a 49 y.o. female in office for RY follow up.  PSG and CPAP titration were reviewed by me and noted below.  Results were dicussed with patient and multiple good questions were answered.  PSG showed mild RY and patient started CPAP after testing.  States she is doing okay with CPAP.  States does feel a little better when waking, but is still fatigued.    Patient is using CPAP  8-9 hrs/night. Using humidifier. No snoring on CPAP. The pressure is well tolerated. The mask is comfortable- nasal mask. No mask leak. +daytime sleepiness. Denies nodding off when driving. No dry nose or throat. +fatigue. Bedtime is  pm and rise time is 530-6 am. Sleep onset is few minutes. Wakes up 2-3 times at night total. No nocturia. It takes usually few minutes to fall back a sleep. Rare naps during the day. No headache in am. No weight gain. 1-2 caffienated beverages during the day. Occasional alcohol. ESS is 12      No hallucinations when falling or waking from sleep  No cataplexy  No sleep paralysis  Some RLS symptoms but better since starting CPAP.

## 2024-12-11 ENCOUNTER — HOSPITAL ENCOUNTER (OUTPATIENT)
Dept: PHYSICAL THERAPY | Age: 49
Setting detail: THERAPIES SERIES
Discharge: HOME | End: 2024-12-11
Payer: COMMERCIAL

## 2024-12-11 PROCEDURE — 97112 NEUROMUSCULAR REEDUCATION: CPT

## 2024-12-11 PROCEDURE — 97140 MANUAL THERAPY 1/> REGIONS: CPT

## 2024-12-11 PROCEDURE — 97110 THERAPEUTIC EXERCISES: CPT

## 2024-12-11 NOTE — FLOWSHEET NOTE
[facility]- Outpatient Rehabilitation and Therapy [facility], [address], [place] [address] office: [phone] fax: [phone]         Physical Therapy: TREATMENT/PROGRESS NOTE   Patient: Lori Espinosa (49 y.o. female)   Examination Date: 2024   :  1975 MRN: [id number]   Visit #: 18 by 25  Insurance Allowable Auth Needed   3x4-6wks by 25 [x]Yes    []No        Insurance: Payor: PMA / Plan: PMA WC / Product Type: *No Product type* /   Insurance ID: [id number] - (Worker's Comp)  Secondary Insurance (if applicable):    Treatment Diagnosis: Left ankle pain, mobility and strength deficits.    ICD-10-CM    1. Left ankle pain, unspecified chronicity  M25.572       2. Ankle stiffness, left  M25.672            Medical Diagnosis:  Sprain of left ankle, unspecified ligament, initial encounter [S93.402A]   Referring Physician: Chloe Walker MD  PCP: Rom Alves MD     Plan of care signed (Y/N): Y eval    Date of Patient follow up with Physician: ?     Plan of Care Report: NO   POC update due: (10 visits /OR AUTH LIMITS, whichever is less)   2024                                             Medical History:  Comorbidities:  Depression  Relevant Medical History:   Past Medical History:   Diagnosis Date    Depression     Insomnia     RY on CPAP                                               Precautions/ Contra-indications:           Latex allergy:  NO  Pacemaker:    NO  Contraindications for Manipulation: blood thinners (relative)  Date of Surgery:    Other:    Red Flags:  None    Suicide Screening:   The patient did not verbalize a primary behavioral concern, suicidal ideation, suicidal intent, or demonstrate suicidal behaviors.    Preferred Language for Healthcare:   [x] English       [] other:    SUBJECTIVE EXAMINATION     Patient stated complaint:  Patient reports having some increased soreness in a different spot around medial ankle, did a lot of driving

## 2024-12-16 ENCOUNTER — HOSPITAL ENCOUNTER (OUTPATIENT)
Dept: PHYSICAL THERAPY | Age: 49
Setting detail: THERAPIES SERIES
Discharge: HOME | End: 2024-12-16
Payer: COMMERCIAL

## 2024-12-16 PROCEDURE — 97112 NEUROMUSCULAR REEDUCATION: CPT

## 2024-12-16 PROCEDURE — 97530 THERAPEUTIC ACTIVITIES: CPT

## 2024-12-16 NOTE — PLAN OF CARE
ICU Transfer Note    Transfer from: ICU    Transfer to: (  ) Medicine    (  ) Telemetry    (  ) RCU                               (  ) Palliative    ( x ) Stroke Unit    (  ) MICU    (  ) __________________    Signout given to:     HPI / CCU COURSE:    Patient is a 60y old Male who presents with right sided weakness (31 Jul 2020 08:57). Patient also had AMS and expressive aphasia    Currently admitted to the ICU  with the primary diagnosis of  Acute L MCA territory stroke  In the ICU, we did post TPA protocol and FU CT head which was negative. ASA restarted . VEEG today then downgrade to stroke unit as patient is medically stable.     Today is hospital day 2d, and this morning he is in NAD and  as per neuro this morning (6am) He is no longer aphasic, speaking in full sentences.        Vital Signs Last 24 Hrs  T(C): 36.1 (01 Aug 2020 04:00), Max: 36.5 (31 Jul 2020 12:00)  T(F): 97 (01 Aug 2020 04:00), Max: 97.7 (31 Jul 2020 12:00)  HR: 64 (01 Aug 2020 10:00) (60 - 76)  BP: 145/80 (01 Aug 2020 10:00) (125/78 - 171/94)  BP(mean): 0 (01 Aug 2020 10:00) (0 - 126)  RR: 19 (01 Aug 2020 10:00) (10 - 32)  SpO2: 96% (01 Aug 2020 10:00) (94% - 99%)    I&O's Summary    31 Jul 2020 07:01  -  01 Aug 2020 07:00  --------------------------------------------------------  IN: 1750 mL / OUT: 1878 mL / NET: -128 mL    01 Aug 2020 07:01  -  01 Aug 2020 10:33  --------------------------------------------------------  IN: 225 mL / OUT: 158 mL / NET: 67 mL        Physical Exam:       LABS:   CARDIAC MARKERS ( 30 Jul 2020 14:04 )  x     / <0.01 ng/mL / x     / x     / x                                  14.0   4.05  )-----------( 158      ( 01 Aug 2020 04:50 )             43.5       08-01    143  |  104  |  15  ----------------------------<  115<H>  4.3   |  28  |  1.1    Ca    9.3      01 Aug 2020 04:50  Phos  3.5     08-01  Mg     2.0     08-01    TPro  6.5  /  Alb  4.5  /  TBili  1.6<H>  /  DBili  x   /  AST  21  /  ALT  20  /  AlkPhos  48  07-31      PT/INR - ( 31 Jul 2020 04:23 )   PT: 12.70 sec;   INR: 1.10 ratio         PTT - ( 31 Jul 2020 04:23 )  PTT:31.3 sec    ABG - ( 31 Jul 2020 15:28 )  pH, Arterial: 7.48  pH, Blood: x     /  pCO2: 38    /  pO2: 72    / HCO3: 28    / Base Excess: 4.7   /  SaO2: 96            ECG:   < from: 12 Lead ECG (07.30.20 @ 14:44) >  Atrial-sensed ventricular-paced rhythm  Abnormal ECG  < end of copied text >      ECHO:     Last EKG: < from: 12 Lead ECG (07.30.20 @ 14:44) >  Atrial-sensed ventricular-paced rhythm  Abnormal ECG  < end of copied text >      Imaging:    < from: CT Head No Cont (07.31.20 @ 11:58) >  IMPRESSION:    Allowing for the limitation from streak artifact, no acute intracranial pathology.    < end of copied text >      Last CTA/MRA:    Last CTP: < from: CT Perfusion w/ Maps w/ IV Cont (07.30.20 @ 15:42) >  EXAM:  CT PERFUSION W MAPS IC          INTERPRETATION:  Clinical History / Reason for exam: Stroke code. Right-sided weakness and altered mental status.    Technique: CT angiogram of the head and neck including perfusion study of the brain.  Contiguous CT axial images of the head and neck were obtained following the bolus intravenous administration of 120 cc Optiray with multiple 3-D and MIP reformats with perfusion mapping performed on a separate 3D workstation ([employer]).    Correlation made with accompanying noncontrast head CT.  CT neck dated 6/25/2019 is reviewed.    Findings:    CTA neck:  There is streak artifact from first pass contrast within the venous structures limiting evaluation.    The visualized aortic arch and great vessel origins are patent.    The common, internal and external carotid arteries appear grossly patent.    The vertebral arteries appear grossly patent, dominant on the right.    CTA brain: The distal segments of the internal carotid arteries are patent.  The anterior and middle cerebral arteries are patent.    The distal vertebral arteries are patent. The basilar artery is patent. The posterior cerebral arteries are patent.    Perfusion:  There is no evidence of focal perfusion deficit to suggest acute cerebral ischemia.    Other:  Mildly prominent lymph nodes in the left supraclavicular region, nonspecific.  8 mm right thyroid nodule, stable.    IMPRESSION:    1.  No gross evidence of major vascular stenosis or occlusion (artifact is noted in the neck). Normal perfusion images.    2.  Mildly prominent left supraclavicular lymph nodes, nonspecific. Follow-up with a chest CT may be obtained.        ASSESSMENT & PLAN:     Patient is a 60y old Male who presents with right sided weakness (31 Jul 2020 08:57). Patient also had AMS and expressive aphasia      Currently admitted to the ICU  with the primary diagnosis of stroke.     Social History (marital status, living situation, occupation, tobacco use, alcohol and drug use, and sexual history): Remote history of smoking  	No alcohol or recreational drugs  Lives at home with family      Today is hospital day 2d, and this morning he is in NAD and  as per neuro this morning (6am) He is no longer aphasic, which is new from yesterday.      1. Acute LMCA  Stroke s/p TPA  - r sided weakness/expressive aphasia   -CT head showed no acute changes, but was limited due to motion artifact. He received TPA, CTA/CTP are negative for signs of acute ischemia.  -repeat CT head  : no evidence of intracranial hemmorhage, mass effect, midline shift. Mild chronic / stable  microvesicular ischemia noted   -CW statin  -restarted aspirin 7/31. Continue  -TTE: mild LVH . Normal LV systolic function . EF = 56%  - SBP goal < 180/110  - High intensity statin  - MRI Brain if no contraindication  - Keep BP < 180/110  - PT/OT  - f/u vEEG    2. HTN  - SP esmolol drip   - Home meds held    3.  DM  - Hold oral meds  - Check fs  - Start insulin if glu>180  - Start and adjust insulin s/s prn    4.  Aggressive nisreen marginal zone lymphoma  - Maintenance on Rituxan, last dose 12/31  - op FU     5.  COPD   - Not on home O2  - Continue home meds  - Duonebs PRN    DVT ppx:  seq  Gi ppx: protonix  Code status: full code  [facility]      Patient admitted to the ICU for :   FOR FOLLOW UP:  [ ] obtain MRI brain   [ ] FU Veeg ICU Transfer Note    Transfer from: ICU    Transfer to: (  ) Medicine    (  ) Telemetry    (  ) RCU                               (  ) Palliative    ( x ) Stroke Unit    (  ) MICU    (  ) __________________    Signout given to:     HPI / CCU COURSE:    Patient is a 60y old Male who presents with right sided weakness (31 Jul 2020 08:57). Patient also had AMS and expressive aphasia    Currently admitted to the ICU  with the primary diagnosis of  Acute L MCA territory stroke  In the ICU, we did post TPA protocol and FU CT head which was negative. ASA restarted . VEEG yesterday was negative. Today he is no longer aphasic, and is medically stable for downgrade.             ASSESSMENT & PLAN:     Patient is a 60y old Male who presents with right sided weakness and aphasia(31 Jul 2020 08:57). Aphasia has since resolved.          1. Acute LMCA  Stroke s/p TPA.-With r sided weakness/expressive aphasia   - Initial CT head showed no acute changes, but was limited due to motion artifact. He received TPA, CTA/CTP are negative for signs of acute ischemia.  -repeat CT head  showed no evidence of intracranial hemmorhage, mass effect, midline shift. Mild chronic / stable  microvesicular ischemia noted   -TTE: mild LVH . Normal LV systolic function . EF = 56%  - vEEG negative  - Obtain MRI Brain if no contraindication  - Keep BP < 180/110  - PT/OT  - continue aspirin statin    2. HTN  - Home meds held for esmolol drip; start as needed    3.  DM  - Hold oral meds  - Check fs  - Start insulin if glu>180  - Start and adjust insulin s/s prn    4.  Aggressive nisreen marginal zone lymphoma  - Maintenance on Rituxan, last dose 12/31  - op FU     5.  COPD   - Not on home O2  - Continue home meds  - Duonebs PRN    DVT ppx:  seq  Gi ppx: protonix  Code status: full code  Dispo:  [facility] when medically stable      Patient admitted to the ICU for :   FOR FOLLOW UP:  [ ] obtain MRI brain   [ ] FU Veeg Toe Raises with Counter Support  - 1 x daily - 7 x weekly - 2 sets - 10 reps  - Ankle Inversion Eversion Towel Slide  - 1 x daily - 7 x weekly - 2 sets - 10 reps  - Seated Ankle Eversion with Resistance  - 1 x daily - 7 x weekly - 3 sets - 10 reps  - Long Sitting Ankle Dorsiflexion with Anchored Resistance  - 1 x daily - 7 x weekly - 3 sets - 10 reps       ASSESSMENT      Today's Assessment: POC/PROGRESS UPDATE: Pt. continues to present with functional deficits in strength symmetry and eccentric control  limiting ability with walking on uneven ground, walking up/down stairs, heavy home activity, and yardwork .  During therapy this date, patient required tactile cueing and muscle facilitation for improving proper muscle recruitment and activation/motor control patterns, modulating pain, increasing ROM, reduce/eliminate soft tissue swelling/inflammation/restriction, and improving soft tissue extensibility. Patient will continue to benefit from ongoing evaluation and advanced clinical decision from a Physical Therapist to improve pain control, ROM, muscle strength, neuromuscular control, endurance, normalization of gait, and functional mobility to safely return to PLOF without symptoms or restrictions.    Medical Necessity Documentation:  I certify that this patient meets the below criteria necessary for medical necessity for care and/or justification of therapy services:  The patient has functional impairments and/or activity limitations and would benefit from continued outpatient therapy services to address the deficits outlined in the patients goals  The patient has a musculoskeletal condition(s) with a corresponding ICD-10 code that is of complexity and severity that require skilled therapeutic intervention. This has a direct and significant impact on the need for therapy and significantly impacts the rate of recovery.     Return to Play: NA    Prognosis for POC: [x] Good [] Fair  [] Poor    Patient requires

## 2024-12-17 ENCOUNTER — APPOINTMENT (OUTPATIENT)
Dept: PHYSICAL THERAPY | Age: 49
End: 2024-12-17
Payer: COMMERCIAL

## 2024-12-20 ENCOUNTER — HOSPITAL ENCOUNTER (OUTPATIENT)
Dept: PHYSICAL THERAPY | Age: 49
Setting detail: THERAPIES SERIES
Discharge: HOME | End: 2024-12-20
Payer: COMMERCIAL

## 2024-12-20 PROCEDURE — 97140 MANUAL THERAPY 1/> REGIONS: CPT

## 2024-12-20 PROCEDURE — 97110 THERAPEUTIC EXERCISES: CPT

## 2024-12-20 NOTE — FLOWSHEET NOTE
orthotic device. (Only if applicable for orthotic eval)     Long Term Goals: To be achieved in: 4-6 weeks  1. Disability index score of 10% or less for the LEFS to assist with reaching prior level of function with activities such as driving at least 2 hours at a time without pain for occupational activities.  [x] Progressing: [] Met: [] Not Met: [] Adjusted  2. Patient will demonstrate increased AROM of left ankle to WNL without pain or swelling to allow for proper joint functioning to enable patient to allow walking at least 30 mins at a time for occupational and fitness activities.   [x] Progressing: [] Met: [] Not Met: [] Adjusted  3. Patient will demonstrate increased Strength of LE's to at least 4+/5 throughout without pain to allow for proper functional mobility to enable patient to return to standing at least 30 mins at a time without pain or limitations for household and occupational activities.   [x] Progressing: [] Met: [] Not Met: [] Adjusted  4. Patient will return to sitting for at least 2 hours at a time without increased symptoms or restriction to allow sitting through a movie.   [x] Progressing: [] Met: [] Not Met: [] Adjusted  5. To be able to do fitness activities or social activities weekly without limitations.    [x] Progressing: [] Met: [] Not Met: [] Adjusted   6. Patient will demonstrate decreased edema of ankle to  equal to non affected ankle to allow for proper functional mobility and muscle recruitment to enable patient to return to perform household transfers without pain.   [x] Progressing: [] Met: [] Not Met: [] Adjusted       Overall Progression Towards Functional goals/ Treatment Progress Update:  [x] Patient is progressing as expected towards functional goals listed.    [] Progression is slowed due to complexities/Impairments listed.  [] Progression has been slowed due to co-morbidities.  [] Plan just implemented, too soon (<30 days) to assess goals progression   [] Goals require

## 2024-12-24 ENCOUNTER — APPOINTMENT (OUTPATIENT)
Dept: PHYSICAL THERAPY | Age: 49
End: 2024-12-24
Payer: COMMERCIAL

## 2024-12-27 ENCOUNTER — HOSPITAL ENCOUNTER (OUTPATIENT)
Dept: PHYSICAL THERAPY | Age: 49
Setting detail: THERAPIES SERIES
Discharge: HOME | End: 2024-12-27
Payer: COMMERCIAL

## 2024-12-27 PROCEDURE — 97110 THERAPEUTIC EXERCISES: CPT

## 2024-12-27 PROCEDURE — 97140 MANUAL THERAPY 1/> REGIONS: CPT

## 2024-12-27 NOTE — FLOWSHEET NOTE
[facility]- [facility]patient [facility] Therapy [facility], [place] [address] office: [phone] fax: [phone]           Physical Therapy: TREATMENT/PROGRESS NOTE   Patient: Lori Espinosa (49 y.o. female)   Examination Date: 2024   :  1975 MRN: [id number]   Visit #: 10 /12-18 by 25  Insurance Allowable Auth Needed   [id number] by 25 [x]Yes    []No        Insurance: Payor: PMA / Plan: PMA WC / Product Type: *No Product type* /   Insurance ID: [id number] - (Worker's Comp)  Secondary Insurance (if applicable):    Treatment Diagnosis: Left ankle pain, mobility and strength deficits.    ICD-10-CM    1. Left ankle pain, unspecified chronicity  M25.572       2. Ankle stiffness, left  M25.672            Medical Diagnosis:  Sprain of left ankle, unspecified ligament, initial encounter [S93.402A]   Referring Physician: Chloe Walker MD  PCP: Rom Alves MD     Plan of care signed (Y/N): Y eval    Date of Patient follow up with Physician: ?     Plan of Care Report: NO   POC update due: (10 visits /OR AUTH LIMITS, whichever is less) 24                     POC: 24                         Medical History:  Comorbidities:  Depression  Relevant Medical History:   Past Medical History:   Diagnosis Date    Depression     Insomnia     RY on CPAP                                               Precautions/ Contra-indications:           Latex allergy:  NO  Pacemaker:    NO  Contraindications for Manipulation: blood thinners (relative)  Date of Surgery:    Other:    Red Flags:  None    Suicide Screening:   The patient did not verbalize a primary behavioral concern, suicidal ideation, suicidal intent, or demonstrate suicidal behaviors.    Preferred Language for Healthcare:   [x] English       [] other:    SUBJECTIVE EXAMINATION     Patient stated complaint:  Patient reports ankle is ok, but had a fall over a cord and hit her right knee and is having

## 2025-01-13 ENCOUNTER — TELEPHONE (OUTPATIENT)
Dept: PULMONOLOGY | Age: 50
End: 2025-01-13

## 2025-01-21 ENCOUNTER — OFFICE VISIT (OUTPATIENT)
Dept: ORTHOPEDIC SURGERY | Age: 50
End: 2025-01-21

## 2025-01-21 DIAGNOSIS — S93.492A SPRAIN OF ANTERIOR TALOFIBULAR LIGAMENT OF LEFT ANKLE, INITIAL ENCOUNTER: Primary | ICD-10-CM

## 2025-01-25 PROBLEM — S93.492A SPRAIN OF ANTERIOR TALOFIBULAR LIGAMENT OF LEFT ANKLE: Status: ACTIVE | Noted: 2025-01-25

## 2025-01-25 NOTE — PROGRESS NOTES
CHIEF COMPLAINT: Left ankle pain/ Ankle ATF sprain.    DATE OF INJURY: 2024, Worker's Comp    HISTORY:  Ms. Espinosa 49 y.o.  female presents today for f/u evaluation of a left ankle injury which occurred when she was walking and slip[ped on Ice in 2024. She is complaining of lateral ankle pain. She was seen by WC then by Dr Blanchard, she had PT and got MRI left ankle. She reports today no pain, 0/10. Pain increase with walking and decrease with rest and elevation. No numbness or tingling sensation, and no other complaint.      Past Medical History:   Diagnosis Date    Depression     Insomnia     RY on CPAP         Past Surgical History:   Procedure Laterality Date    COLONOSCOPY      ENDOMETRIAL BIOPSY      LEG BIOPSY EXCISION Right 2023    EXCISION OF RIGHT THIGH LIPOMA performed by Alex Meza MD at [facility] [place]    LIPOMA RESECTION Right 2023    EXCISION OF RIGHT THIGH LIPOMA - Right    SHOULDER SURGERY Right 2016        Social History     Socioeconomic History    Marital status:      Spouse name: Not on file    Number of children: Not on file    Years of education: Not on file    Highest education level: Not on file   Occupational History    Not on file   Tobacco Use    Smoking status: Former     Current packs/day: 0.00     Average packs/day: 0.3 packs/day for 8.0 years (2.0 ttl pk-yrs)     Types: Cigarettes     Quit date: 10/1/1999     Years since quittin.3     Passive exposure: Never    Smokeless tobacco: Never   Vaping Use    Vaping status: Never Used   Substance and Sexual Activity    Alcohol use: Yes     Alcohol/week: 2.0 standard drinks of alcohol     Types: 1 Glasses of wine, 1 Drinks containing 0.5 oz of alcohol per week     Comment: occasionally    Drug use: No    Sexual activity: Yes     Partners: Male   Other Topics Concern    Not on file   Social History Narrative    Not on file     Social Determinants of Health     Financial Resource Strain: Not on file

## 2025-01-28 ENCOUNTER — TELEMEDICINE (OUTPATIENT)
Dept: SLEEP MEDICINE | Age: 50
End: 2025-01-28
Payer: COMMERCIAL

## 2025-01-28 DIAGNOSIS — G47.10 HYPERSOMNIA: ICD-10-CM

## 2025-01-28 DIAGNOSIS — R53.83 OTHER FATIGUE: ICD-10-CM

## 2025-01-28 DIAGNOSIS — Z71.89 CPAP USE COUNSELING: ICD-10-CM

## 2025-01-28 DIAGNOSIS — G47.33 MILD OBSTRUCTIVE SLEEP APNEA: Primary | ICD-10-CM

## 2025-01-28 DIAGNOSIS — E66.9 OBESITY (BMI 30-39.9): ICD-10-CM

## 2025-01-28 PROCEDURE — 99214 OFFICE O/P EST MOD 30 MIN: CPT | Performed by: NURSE PRACTITIONER

## 2025-01-28 ASSESSMENT — SLEEP AND FATIGUE QUESTIONNAIRES
HOW LIKELY ARE YOU TO NOD OFF OR FALL ASLEEP WHILE SITTING INACTIVE IN A PUBLIC PLACE: SLIGHT CHANCE OF DOZING
HOW LIKELY ARE YOU TO NOD OFF OR FALL ASLEEP WHILE SITTING QUIETLY AFTER LUNCH WITHOUT ALCOHOL: SLIGHT CHANCE OF DOZING
HOW LIKELY ARE YOU TO NOD OFF OR FALL ASLEEP WHILE LYING DOWN TO REST IN THE AFTERNOON WHEN CIRCUMSTANCES PERMIT: HIGH CHANCE OF DOZING
HOW LIKELY ARE YOU TO NOD OFF OR FALL ASLEEP WHILE SITTING AND READING: SLIGHT CHANCE OF DOZING
HOW LIKELY ARE YOU TO NOD OFF OR FALL ASLEEP WHILE WATCHING TV: SLIGHT CHANCE OF DOZING
ESS TOTAL SCORE: 12
HOW LIKELY ARE YOU TO NOD OFF OR FALL ASLEEP IN A CAR, WHILE STOPPED FOR A FEW MINUTES IN TRAFFIC: SLIGHT CHANCE OF DOZING
HOW LIKELY ARE YOU TO NOD OFF OR FALL ASLEEP WHEN YOU ARE A PASSENGER IN A CAR FOR AN HOUR WITHOUT A BREAK: HIGH CHANCE OF DOZING
HOW LIKELY ARE YOU TO NOD OFF OR FALL ASLEEP WHILE SITTING AND TALKING TO SOMEONE: SLIGHT CHANCE OF DOZING

## 2025-01-28 NOTE — PROGRESS NOTES
off while driving but can get sleepy at times.  Gained 60 pounds in the past 2-3 years.  +forgetfulness and decreased concentration. No nasal congestion at night. Drinks 1-2 caffinated beverages per day. Occasional alcohol. +restless feelings in legs at night. No loss of muscle strength when angry or laugh. No hallucination when dozing off or waking up from sleep. No paralysis upon awakening from sleep or going to sleep.  +teeth grinding. No nightmares. No sleep walking. No night time panic attacks. No narcotics. No drug abuse. +history of depression, +history of anxiety states feels pretty well controlled. No history of atrial fibrillation. No history of DM. No history of HTN. No history of ischemic heart disease. No history of stroke. ESS is 14. No smoking. No known FH for RLS or narcolepsy. +FH for RY- mom         1/28/2025     2:32 PM 12/6/2024     8:55 AM 8/2/2024     8:11 AM 8/18/2023     2:14 PM 2/14/2023    11:12 AM 11/10/2022    10:33 AM 8/29/2022     2:53 PM   Sleep Medicine   Sitting and reading 1 1 1 1 1 1 1   Watching TV 1 2 1 1 1 1 1   Sitting, inactive in a public place (e.g. a theatre or a meeting) 1 1 0 0 0 0 0   As a passenger in a car for an hour without a break 3 3 3 3 3 3 3   Lying down to rest in the afternoon when circumstances permit 3 2 2 3 2 3 3   Sitting and talking to someone 1 1 0 0 1 0 0   Sitting quietly after a lunch without alcohol 1 1 1 1 1 1 1   In a car, while stopped for a few minutes in traffic 1 0 1 1 1 0 0   [place] Sleepiness Score 12 11 9 10 10 9 9   Neck (Inches)       15.25       Past Medical History:  Past Medical History:   Diagnosis Date    Depression     Insomnia     RY on CPAP        Past Surgical History:        Procedure Laterality Date    COLONOSCOPY      ENDOMETRIAL BIOPSY      LEG BIOPSY EXCISION Right 7/18/2023    EXCISION OF RIGHT THIGH LIPOMA performed by [place]za [place] [facility] [place]    LIPOMA RESECTION Right 07/18/2023    EXCISION OF RIGHT THIGH LIPOMA

## 2025-08-26 ENCOUNTER — TELEMEDICINE (OUTPATIENT)
Dept: SLEEP MEDICINE | Age: 50
End: 2025-08-26
Payer: COMMERCIAL

## 2025-08-26 DIAGNOSIS — R53.83 OTHER FATIGUE: ICD-10-CM

## 2025-08-26 DIAGNOSIS — Z71.89 CPAP USE COUNSELING: ICD-10-CM

## 2025-08-26 DIAGNOSIS — G47.33 MILD OBSTRUCTIVE SLEEP APNEA: Primary | ICD-10-CM

## 2025-08-26 DIAGNOSIS — E66.9 OBESITY (BMI 30-39.9): ICD-10-CM

## 2025-08-26 DIAGNOSIS — G47.10 HYPERSOMNIA: ICD-10-CM

## 2025-08-26 PROCEDURE — 99214 OFFICE O/P EST MOD 30 MIN: CPT | Performed by: NURSE PRACTITIONER

## 2025-08-26 RX ORDER — ESTRADIOL 1 MG/1
TABLET ORAL
COMMUNITY
Start: 2025-06-27

## 2025-08-26 RX ORDER — MEDROXYPROGESTERONE ACETATE 2.5 MG/1
TABLET ORAL
COMMUNITY

## 2025-08-26 RX ORDER — LIDOCAINE 5% 5 G/100G
CREAM TOPICAL
COMMUNITY
Start: 2025-08-15

## 2025-08-26 ASSESSMENT — SLEEP AND FATIGUE QUESTIONNAIRES
HOW LIKELY ARE YOU TO NOD OFF OR FALL ASLEEP WHILE SITTING AND READING: SLIGHT CHANCE OF DOZING
HOW LIKELY ARE YOU TO NOD OFF OR FALL ASLEEP WHILE LYING DOWN TO REST IN THE AFTERNOON WHEN CIRCUMSTANCES PERMIT: HIGH CHANCE OF DOZING
ESS TOTAL SCORE: 10
HOW LIKELY ARE YOU TO NOD OFF OR FALL ASLEEP WHILE SITTING AND TALKING TO SOMEONE: WOULD NEVER DOZE
HOW LIKELY ARE YOU TO NOD OFF OR FALL ASLEEP WHEN YOU ARE A PASSENGER IN A CAR FOR AN HOUR WITHOUT A BREAK: HIGH CHANCE OF DOZING
HOW LIKELY ARE YOU TO NOD OFF OR FALL ASLEEP WHILE SITTING QUIETLY AFTER LUNCH WITHOUT ALCOHOL: SLIGHT CHANCE OF DOZING
HOW LIKELY ARE YOU TO NOD OFF OR FALL ASLEEP WHILE WATCHING TV: SLIGHT CHANCE OF DOZING
HOW LIKELY ARE YOU TO NOD OFF OR FALL ASLEEP WHILE SITTING INACTIVE IN A PUBLIC PLACE: SLIGHT CHANCE OF DOZING
HOW LIKELY ARE YOU TO NOD OFF OR FALL ASLEEP IN A CAR, WHILE STOPPED FOR A FEW MINUTES IN TRAFFIC: WOULD NEVER DOZE

## (undated) DEVICE — ELECTRODE PT RET AD L9FT HI MOIST COND ADH HYDRGEL CORDED

## (undated) DEVICE — SOLUTION IV IRRIG 500ML 0.9% SODIUM CHL 2F7123

## (undated) DEVICE — SUTURE VCRL SZ 3-0 L18IN ABSRB UD L26MM SH 1/2 CIR J864D

## (undated) DEVICE — SYRINGE MED 10ML LUERLOCK TIP W/O SFTY DISP

## (undated) DEVICE — APPLICATOR PREP 6ML 0.7% IOD POVACRYLEX 74% ISO ALC

## (undated) DEVICE — CANNULA NSL 13FT TUBE AD ETCO2 DIV SAMP M

## (undated) DEVICE — GOWN SIRUS NONREIN XL W/TWL: Brand: MEDLINE INDUSTRIES, INC.

## (undated) DEVICE — APPLICATOR MEDICATED 26 CC SOLUTION HI LT ORNG CHLORAPREP

## (undated) DEVICE — 3M™ STERI-STRIP™ COMPOUND BENZOIN TINCTURE 40 BAGS/CARTON 4 CARTONS/CASE C1544: Brand: 3M™ STERI-STRIP™

## (undated) DEVICE — SUTURE VCRL SZ 4-0 L18IN ABSRB UD L19MM PS-2 3/8 CIR PRIM J496H

## (undated) DEVICE — NEEDLE HYPO 25GA L1.5IN BLU POLYPR HUB S STL REG BVL STR

## (undated) DEVICE — GLOVE ORANGE PI 7 1/2   MSG9075

## (undated) DEVICE — MAJOR SET UP PK

## (undated) DEVICE — TIP SUCT DIA12FR W STYL CTRL VENT DISPOSABLE FRAZ

## (undated) DEVICE — PACK,UNIVERSAL,SPLIT,II,AURORA: Brand: MEDLINE

## (undated) DEVICE — Z INACTIVE USE 2855096 SPONGE GZ W4XL4IN 8 PLY 100% COT